# Patient Record
Sex: FEMALE | Race: WHITE | Employment: STUDENT | URBAN - METROPOLITAN AREA
[De-identification: names, ages, dates, MRNs, and addresses within clinical notes are randomized per-mention and may not be internally consistent; named-entity substitution may affect disease eponyms.]

---

## 2018-01-11 ENCOUNTER — OFFICE VISIT (OUTPATIENT)
Dept: FAMILY MEDICINE CLINIC | Age: 33
End: 2018-01-11

## 2018-01-11 VITALS
HEART RATE: 68 BPM | BODY MASS INDEX: 32.78 KG/M2 | RESPIRATION RATE: 18 BRPM | HEIGHT: 66 IN | WEIGHT: 204 LBS | OXYGEN SATURATION: 99 % | SYSTOLIC BLOOD PRESSURE: 106 MMHG | DIASTOLIC BLOOD PRESSURE: 71 MMHG | TEMPERATURE: 98.2 F

## 2018-01-11 DIAGNOSIS — Z13.29 SCREENING FOR HYPOTHYROIDISM: ICD-10-CM

## 2018-01-11 DIAGNOSIS — Z00.00 PHYSICAL EXAM: Primary | ICD-10-CM

## 2018-01-11 DIAGNOSIS — E87.1 HYPONATREMIA: ICD-10-CM

## 2018-01-11 DIAGNOSIS — Z13.1 SCREENING FOR DIABETES MELLITUS: ICD-10-CM

## 2018-01-11 DIAGNOSIS — Z13.21 SCREENING FOR MALNUTRITION: ICD-10-CM

## 2018-01-11 DIAGNOSIS — R55 SYNCOPE, UNSPECIFIED SYNCOPE TYPE: ICD-10-CM

## 2018-01-11 DIAGNOSIS — Z13.220 SCREENING FOR HYPERLIPIDEMIA: ICD-10-CM

## 2018-01-11 DIAGNOSIS — E66.9 OBESITY (BMI 30-39.9): ICD-10-CM

## 2018-01-11 DIAGNOSIS — F32.A DEPRESSION, UNSPECIFIED DEPRESSION TYPE: ICD-10-CM

## 2018-01-11 PROBLEM — F33.9 RECURRENT DEPRESSION (HCC): Status: ACTIVE | Noted: 2018-01-11

## 2018-01-11 RX ORDER — ESCITALOPRAM OXALATE 10 MG/1
10 TABLET ORAL DAILY
COMMUNITY
End: 2018-01-11 | Stop reason: ALTCHOICE

## 2018-01-11 RX ORDER — BUPROPION HYDROCHLORIDE 75 MG/1
75 TABLET ORAL 2 TIMES DAILY
Qty: 60 TAB | Refills: 5 | Status: SHIPPED | OUTPATIENT
Start: 2018-01-11 | End: 2018-04-26 | Stop reason: SDUPTHER

## 2018-01-11 NOTE — PATIENT INSTRUCTIONS

## 2018-01-11 NOTE — PROGRESS NOTES
1. Have you been to the ER, urgent care clinic since your last visit? Hospitalized since your last visit? Urgent care for Flu in December 2017.    2. Have you seen or consulted any other health care providers outside of the [address] [place] [address] Preet since your last visit? Include any pap smears or colon screening.  [address]      Chief Complaint   Patient presents with   [address]

## 2018-01-11 NOTE — MR AVS SNAPSHOT
Visit Information Date & Time Provider Department Dept. Phone Encounter #  
 1/11/2018  9:00 AM Destini Enrique MD [address] [place] [address] [id number] Follow-up Instructions Return in about 3 months (around 4/11/2018). Upcoming Health Maintenance Date Due DTaP/Tdap/Td series (1 - Tdap) 11/20/2007 PAP AKA CERVICAL CYTOLOGY 11/20/2007 Influenza Age 5 to Adult 8/1/2017 Allergies as of 1/11/2018  Review Complete On: 1/11/2018 By: Destini Enrique MD  
  
 Severity Noted Reaction Type Reactions Demerol [Meperidine]  01/11/2018    Hives Current Immunizations  Never Reviewed No immunizations on file. Not reviewed this visit You Were Diagnosed With   
  
 Codes Comments Physical exam    -  Primary ICD-10-CM: Z00.00 ICD-9-CM: V70.9 Syncope, unspecified syncope type     ICD-10-CM: R55 
ICD-9-CM: 780.2 Obesity (BMI 30-39. 9)     ICD-10-CM: E66.9 ICD-9-CM: 278.00 Screening for diabetes mellitus     ICD-10-CM: Z13.1 ICD-9-CM: V77.1 Screening for hyperlipidemia     ICD-10-CM: Z13.220 ICD-9-CM: V77.91 Screening for hypothyroidism     ICD-10-CM: Z13.29 ICD-9-CM: V77.0 Screening for malnutrition     ICD-10-CM: Z13.21 ICD-9-CM: V77.2 Vitals BP Pulse Temp Resp Height(growth percentile) Weight(growth percentile) 106/71 68 98.2 °F (36.8 °C) (Oral) 18 5' 6\" (1.676 m) 204 lb (92.5 kg) LMP SpO2 BMI OB Status Smoking Status 12/21/2017 99% 32.93 kg/m2 Having regular periods Former Smoker Vitals History BMI and BSA Data Body Mass Index Body Surface Area  
 32.93 kg/m 2 2.08 m 2 Preferred Pharmacy Pharmacy Name Phone [facility] [address] [facility], [address] [place] [address] [phone] Your Updated Medication List  
  
   
This list is accurate as of: 1/11/18  9:59 AM.  Always use your most recent med list.  
  
  
  
  
 LEXAPRO 10 mg tablet Generic drug:  escitalopram oxalate Take 10 mg by mouth daily. QUASENSE 0.15 mg-30 mcg 3mpk Generic drug:  levonorgestrel-ethinyl estradiol TK 1 T PO D We Performed the Following HEMOGLOBIN A1C WITH EAG [36772 CPT(R)] LIPID PANEL [73068 CPT(R)] METABOLIC PANEL, COMPREHENSIVE [54451 CPT(R)] REFERRAL TO CARDIOLOGY [YVE32 Custom] Comments:  
 Episodes of syncope x2. Had chest pain/tighness before the syncope. Was evaluated for epilepsy after the firat time 5 years ago and was negative. eval for abnormal heart rhythm that is leading to the syncope REFERRAL TO NEUROLOGY [YPL30 Custom] Comments:  
 Recent episode of seizure like behavior. Had one other episode 5 years ago and no EEG was done at that time. SLEEP MEDICINE REFERRAL [KYO267 Custom] Comments:  
 Obesity, possible apnea while sleep per , eval and treat for GUERO TSH 3RD GENERATION [37875 CPT(R)] VITAMIN D, 1, 25 DIHYDROXY [64046 CPT(R)] Follow-up Instructions Return in about 3 months (around 4/11/2018). Referral Information Referral ID Referred By Referred To  
  
 [id number] David Horan MD   
   [address] [facility] [address] [place] [address], [address] [place] [address] Phone: [phone] Fax: [phone] Visits Status Start Date End Date 1 New Request 1/11/18 1/11/19 If your referral has a status of pending review or denied, additional information will be sent to support the outcome of this decision. Referral ID Referred By Referred To  
 [id number] Saul blanton, [facility] Anel Hirsch, [address] [place] [address] [address] [place] [address] Phone: [phone] Fax: [phone] Visits Status Start Date End Date 1 New Request 1/11/18 1/11/19  If your referral has a status of pending review or denied, additional information will be sent to support the outcome of this decision. Referral ID Referred By Referred To  
 [id number] Anita Reeves MD [address] [place] Phone: [phone] Fax: [phone] Visits Status Start Date End Date 1 New Request 1/11/18 1/11/19 If your referral has a status of pending review or denied, additional information will be sent to support the outcome of this decision. Patient Instructions Well Visit, Ages 25 to 48: Care Instructions Your Care Instructions Physical exams can help you stay healthy. Your doctor has checked your overall health and may have suggested ways to take good care of yourself. He or she also may have recommended tests. At home, you can help prevent illness with healthy eating, regular exercise, and other steps. Follow-up care is a key part of your treatment and safety. Be sure to make and go to all appointments, and call your doctor if you are having problems. It's also a good idea to know your test results and keep a list of the medicines you take. How can you care for yourself at home? · Reach and stay at a healthy weight. This will lower your risk for many problems, such as obesity, diabetes, heart disease, and high blood pressure. · Get at least 30 minutes of physical activity on most days of the week. Walking is a good choice. You also may want to do other activities, such as running, swimming, cycling, or playing tennis or team sports. Discuss any changes in your exercise program with your doctor. · Do not smoke or allow others to smoke around you. If you need help quitting, talk to your doctor about stop-smoking programs and medicines. These can increase your chances of quitting for good. · Talk to your doctor about whether you have any risk factors for sexually transmitted infections (STIs).  Having one sex partner (who does not have STIs and does not have sex with anyone else) is a good way to avoid these infections. · Use birth control if you do not want to have children at this time. Talk with your doctor about the choices available and what might be best for you. · Protect your skin from too much sun. When you're outdoors from 10 a.m. to 4 p.m., stay in the shade or cover up with clothing and a hat with a wide brim. Wear sunglasses that block UV rays. Even when it's cloudy, put broad-spectrum sunscreen (SPF 30 or higher) on any exposed skin. · See a dentist one or two times a year for checkups and to have your teeth cleaned. · Wear a seat belt in the car. · Drink alcohol in moderation, if at all. That means no more than 2 drinks a day for men and 1 drink a day for women. Follow your doctor's advice about when to have certain tests. These tests can spot problems early. For everyone · Cholesterol. Have the fat (cholesterol) in your blood tested after age 21. Your doctor will tell you how often to have this done based on your age, family history, or other things that can increase your risk for heart disease. · Blood pressure. Have your blood pressure checked during a routine doctor visit. Your doctor will tell you how often to check your blood pressure based on your age, your blood pressure results, and other factors. · Vision. Talk with your doctor about how often to have a glaucoma test. 
· Diabetes. Ask your doctor whether you should have tests for diabetes. · Colon cancer. Have a test for colon cancer at age 48. You may have one of several tests. If you are younger than 48, you may need a test earlier if you have any risk factors. Risk factors include whether you already had a precancerous polyp removed from your colon or whether your parent, brother, sister, or child has had colon cancer. For women · Breast exam and mammogram. Talk to your doctor about when you should have a clinical breast exam and a mammogram. Medical experts differ on whether and how often women under 50 should have these tests. Your doctor can help you decide what is right for you. · Pap test and pelvic exam. Begin Pap tests at age 24. A Pap test is the best way to find cervical cancer. The test often is part of a pelvic exam. Ask how often to have this test. 
· Tests for sexually transmitted infections (STIs). Ask whether you should have tests for STIs. You may be at risk if you have sex with more than one person, especially if your partners do not wear condoms. For men · Tests for sexually transmitted infections (STIs). Ask whether you should have tests for STIs. You may be at risk if you have sex with more than one person, especially if you do not wear a condom. · Testicular cancer exam. Ask your doctor whether you should check your testicles regularly. · Prostate exam. Talk to your doctor about whether you should have a blood test (called a PSA test) for prostate cancer. Experts differ on whether and when men should have this test. Some experts suggest it if you are older than 39 and are -American or have a father or brother who got prostate cancer when he was younger than 72. When should you call for help? Watch closely for changes in your health, and be sure to contact your doctor if you have any problems or symptoms that concern you. Where can you learn more? Go to http://megan-joseph.info/. Enter P072 in the search box to learn more about \"Well Visit, Ages 25 to 48: Care Instructions. \" Current as of: May 12, 2017 Content Version: 11.4 © 8857-3443 Healthwise, Incorporated. Care instructions adapted under license by [employer] (which disclaims liability or warranty for this information).  If you have questions about a medical condition or this instruction, always ask your healthcare professional. [employer] disclaims any warranty or liability for your use of this information. Introducing [facility]! Young Campos introduces [employer] patient portal. Now you can access parts of your medical record, email your doctor's office, and request medication refills online. 1. In your internet browser, go to [email or web address] 2. Click on the First Time User? Click Here link in the Sign In box. You will see the New Member Sign Up page. 3. Enter your Cel[employer]o Access Code exactly as it appears below. You will not need to use this code after youve completed the sign-up process. If you do not sign up before the expiration date, you must request a new code. · [employer] Access Code: [email or web address] Expires: 4/11/2018  9:51 AM 
 
4. Enter the last four digits of your Social Security Number (xxxx) and Date of Birth (mm/dd/yyyy) as indicated and click Submit. You will be taken to the next sign-up page. 5. Create a Celergo ID. This will be your Celergo login ID and cannot be changed, so think of one that is secure and easy to remember. 6. Create a Cel[employer]o password. You can change your password at any time. 7. Enter your Password Reset Question and Answer. This can be used at a later time if you forget your password. 8. Enter your e-mail address. You will receive e-mail notification when new information is available in [address] 9. Click Sign Up. You can now view and download portions of your medical record. 10. Click the Download Summary menu link to download a portable copy of your medical information. If you have questions, please visit the Frequently Asked Questions section of the [employer] website. Remember, Cel[employer]o is NOT to be used for urgent needs. For medical emergencies, dial 911. Now available from your iPhone and Android! Please provide this summary of care documentation to your next provider. If you have any questions after today's visit, please call [phone].

## 2018-01-12 LAB
1,25(OH)2D3 SERPL-MCNC: 48.7 PG/ML (ref 19.9–79.3)
ALBUMIN SERPL-MCNC: 4 G/DL (ref 3.5–5.5)
ALBUMIN/GLOB SERPL: 1.3 {RATIO} (ref 1.2–2.2)
ALP SERPL-CCNC: 36 IU/L (ref 39–117)
ALT SERPL-CCNC: 20 IU/L (ref 0–32)
AST SERPL-CCNC: 17 IU/L (ref 0–40)
BILIRUB SERPL-MCNC: 0.2 MG/DL (ref 0–1.2)
BUN SERPL-MCNC: 13 MG/DL (ref 6–20)
BUN/CREAT SERPL: 19 (ref 9–23)
CALCIUM SERPL-MCNC: 9.3 MG/DL (ref 8.7–10.2)
CHLORIDE SERPL-SCNC: 95 MMOL/L (ref 96–106)
CHOLEST SERPL-MCNC: 176 MG/DL (ref 100–199)
CO2 SERPL-SCNC: 25 MMOL/L (ref 18–29)
CREAT SERPL-MCNC: 0.68 MG/DL (ref 0.57–1)
EST. AVERAGE GLUCOSE BLD GHB EST-MCNC: 103 MG/DL
GLOBULIN SER CALC-MCNC: 3 G/DL (ref 1.5–4.5)
GLUCOSE SERPL-MCNC: 83 MG/DL (ref 65–99)
HBA1C MFR BLD: 5.2 % (ref 4.8–5.6)
HDLC SERPL-MCNC: 42 MG/DL
INTERPRETATION, 910389: NORMAL
LDLC SERPL CALC-MCNC: 114 MG/DL (ref 0–99)
POTASSIUM SERPL-SCNC: 4.2 MMOL/L (ref 3.5–5.2)
PROT SERPL-MCNC: 7 G/DL (ref 6–8.5)
SODIUM SERPL-SCNC: 133 MMOL/L (ref 134–144)
TRIGL SERPL-MCNC: 102 MG/DL (ref 0–149)
TSH SERPL DL<=0.005 MIU/L-ACNC: 1.28 UIU/ML (ref 0.45–4.5)
VLDLC SERPL CALC-MCNC: 20 MG/DL (ref 5–40)

## 2018-01-16 NOTE — PROGRESS NOTES
Your sodium level is a little lower than normal. Come back in for a recheck  The blood sugar, vit D and thyroid levels are normal  The cholesterol level is slightly higher than normal. Increase activity  And decrease fried and fast food, I will recheck in 6 months

## 2018-01-17 NOTE — PROGRESS NOTES
Spoke with pt and advised of lab results/recommendations. Provided with lab hours for repeat blood work. Pt verbalized understanding and no further questions.

## 2018-01-23 ENCOUNTER — OFFICE VISIT (OUTPATIENT)
Dept: NEUROLOGY | Age: 33
End: 2018-01-23

## 2018-01-23 ENCOUNTER — OFFICE VISIT (OUTPATIENT)
Dept: CARDIOLOGY CLINIC | Age: 33
End: 2018-01-23

## 2018-01-23 VITALS — BODY MASS INDEX: 32.77 KG/M2 | WEIGHT: 203 LBS | OXYGEN SATURATION: 98 % | HEART RATE: 80 BPM

## 2018-01-23 VITALS
SYSTOLIC BLOOD PRESSURE: 102 MMHG | HEART RATE: 91 BPM | BODY MASS INDEX: 32.78 KG/M2 | HEIGHT: 66 IN | OXYGEN SATURATION: 97 % | DIASTOLIC BLOOD PRESSURE: 76 MMHG | WEIGHT: 204 LBS

## 2018-01-23 DIAGNOSIS — R55 SYNCOPE, UNSPECIFIED SYNCOPE TYPE: Primary | ICD-10-CM

## 2018-01-23 NOTE — PROGRESS NOTES
LAST OFFICE VISIT : Visit date not found        ICD-10-CM ICD-9-CM   1. Syncope, unspecified syncope type R55 780.2            Sourav Rodriguez is a 33 y.o. female referred for syncope by Anne Duque MD.      Cardiac risk factors: none  I have personally obtained the history from the patient. HISTORY OF PRESENTING ILLNESS     The pt report that she was studying and then all of the sudden felt a sharp tight pain in her chest, then she feels a numbness throughout her body and then she told her friend she was going to pass out. The pt states that she slumped over and then jerked back into her chair. She was unconscious for 2 minutes and then was groggy for about 45 seconds. The pt states that this happened previously and she was unconscious for 10 minutes and had memory loss for a few hours after this. She states that she feels like her heart skips a beat and then she has 2 fast beats. The pt notes that this takes her breath away. She reports that she was on lexipro for 2 years and after this episode she was switched to wellbutrin. The pt denies feeling a sense of fluttering prior to passing out. She reports that during the time when she was studying she was not drinking a lot of caffeine and she is not drinking excessive amounts of caffeine. She denies smoking, she is not smoking and has not have a family hx of heart disease. The pt is on birth control but states that she was not on birth control when she had her first episode. The pt states that she exercises 4-5 days a week and she does not have difficulties with this. She states that she does eat a healthy diet. The patient denies chest pain/ shortness of breath, orthopnea, PND, LE edema, palpitations, syncope, presyncope or fatigue.          ACTIVE PROBLEM LIST     Patient Active Problem List    Diagnosis Date Noted    Syncope 01/23/2018    Recurrent depression ([facility] [address] 01/11/2018           PAST MEDICAL HISTORY     Past Medical History:   Diagnosis Date    Anxiety     Depression     Ear ache            PAST SURGICAL HISTORY     Past Surgical History:   Procedure Laterality Date    HX LEEP PROCEDURE  2016    HX OPEN REDUCTION INTERNAL FIXATION  1994    R. elbow    HX TONSILLECTOMY  1990    HX TUMOR REMOVAL  2002    benign          ALLERGIES     Allergies   Allergen Reactions    Demerol [Meperidine] Hives          FAMILY HISTORY     Family History   Problem Relation Age of Onset    Hypertension Maternal Grandmother     Hypertension Maternal Grandfather     negative for cardiac disease       SOCIAL HISTORY     Social History     Social History    Marital status:      Spouse name: N/A    Number of children: N/A    Years of education: N/A     Social History Main Topics    Smoking status: Former Smoker     Packs/day: 0.25     Types: Cigarettes     Quit date: 1/11/2015    Smokeless tobacco: Never Used      Comment: 2-3 cigarettes per day    Alcohol use 0.6 oz/week     1 Glasses of wine per week      Comment: 3-4 glasses per month    Drug use: No    Sexual activity: Yes     Partners: Male     Birth control/ protection: Pill     Other Topics Concern    None     Social History Narrative         MEDICATIONS     Current Outpatient Prescriptions   Medication Sig    QUASENSE 0.15 mg-30 mcg 3MPk TK 1 T PO D    buPROPion (WELLBUTRIN) 75 mg tablet Take 1 Tab by mouth two (2) times a day. Indications: ANXIETY WITH DEPRESSION     No current facility-administered medications for this visit. I have reviewed the nurses notes, vitals, problem list, allergy list, medical history, family, social history and medications. REVIEW OF SYMPTOMS      General: Pt denies excessive weight gain or loss. Pt is able to conduct ADL's  HEENT: Denies blurred vision, headaches, hearing loss, epistaxis and difficulty swallowing. Respiratory: Denies cough, congestion, shortness of breath, BLACKMON, wheezing or stridor.   Cardiovascular: Denies precordial pain, palpitations, edema or PND  Gastrointestinal: Denies poor appetite, indigestion, abdominal pain or blood in stool  Genitourinary: Denies hematuria, dysuria, increased urinary frequency  Musculoskeletal: Denies joint pain or swelling from muscles or joints  Neurologic: Denies tremor, paresthesias, headache, or sensory motor disturbance  Psychiatric: Denies confusion, insomnia, depression  Integumentray: Denies rash, itching or ulcers. Hematologic: Denies easy bruising, bleeding     PHYSICAL EXAMINATION      Vitals:    01/23/18 1136   Pulse: 80   SpO2: 98%   Weight: 203 lb (92.1 kg)     Extended / Orthostatic Vitals:  Patient Position 2: Supine  BP 2: 102/66  Pulse 2: 78  Patient Position 3: Sitting  BP 3: 98/66  Pulse 3: 86  Patient Position 4: Standing  BP 4: 98/72  Pulse 4: 82    General: Well developed, in no acute distress. HEENT: No jaundice, oral mucosa moist, no oral ulcers  Neck: Supple, no stiffness, no lymphadenopathy, supple  Heart:  Normal S1/S2 negative S3 or S4. Regular, no murmur, gallop or rub, no jugular venous distention  Respiratory: Clear bilaterally x 4, no wheezing or rales  Abdomen:   Soft, non-tender, bowel sounds are active.   Extremities:  No edema, normal cap refill, no cyanosis. Musculoskeletal: No clubbing, no deformities  Neuro: A&Ox3, speech clear, gait stable, cooperative, no focal neurologic deficits  Skin: Skin color is normal. No rashes or lesions. Non diaphoretic, moist.  Vascular: 2+ pulses symmetric in all extremities        EKG: NSR     DIAGNOSTIC DATA     1.  Lipids  1/11/18- , HDL 42, , Tg 102         LABORATORY DATA          No results found for: WBC, HGBPOC, HGB, HGBP, HCTPOC, HCT, PHCT, RBCH, PLT, MCV, HGBEXT, HCTEXT, PLTEXT, HGBEXT, HCTEXT, PLTEXT   Lab Results   Component Value Date/Time    Sodium 133 01/11/2018 10:04 AM    Potassium 4.2 01/11/2018 10:04 AM    Chloride 95 01/11/2018 10:04 AM    CO2 25 01/11/2018 10:04 AM    Glucose 83 01/11/2018 10:04 AM BUN 13 01/11/2018 10:04 AM    Creatinine 0.68 01/11/2018 10:04 AM    BUN/Creatinine ratio 19 01/11/2018 10:04 AM    GFR est  01/11/2018 10:04 AM    GFR est non- 01/11/2018 10:04 AM    Calcium 9.3 01/11/2018 10:04 AM    Bilirubin, total 0.2 01/11/2018 10:04 AM    AST (SGOT) 17 01/11/2018 10:04 AM    Alk. phosphatase 36 01/11/2018 10:04 AM    Protein, total 7.0 01/11/2018 10:04 AM    Albumin 4.0 01/11/2018 10:04 AM    A-G Ratio 1.3 01/11/2018 10:04 AM    ALT (SGPT) 20 01/11/2018 10:04 AM           ASSESSMENT/RECOMMENDATIONS:.      1. Syncope  - etiology is difficult to figure out, I am unclear at this point what it could be unless she just has increased vagal tone. I favor at this time doing an echo and a regular stress test to look for any arrhythmias and with her chest pain we will proceed with this. It is doubtful that she has any CAD as she was not exercising at the time and she has exercised since that time. Her pre test probability of her having any CAD is very low. 2. Palpitations   - Will place a triggered loop recorder on her although she states there were no palpitations during the event. 3. Elevated LDL  - most likely related to being overweight and I believe she can lower this on her own through exercise and diet and will give her to opportunity to do this. Particularly at her young age I do not favor putting her on a statin   4. Return in 6 weeks or PRN. Orders Placed This Encounter    AMB POC EKG ROUTINE W/ 12 LEADS, INTER & REP     Order Specific Question:   Reason for Exam:     Answer:   syncope        Follow-up Disposition: Not on File      I have discussed the diagnosis with  Kyree Guerrero and the intended plan as seen in the above orders. Questions were answered concerning future plans. I have discussed medication side effects and warnings with the patient as well. Thank you,  Liv Hopkins MD for involving me in the care of  Kyree Guerrero.  Please do not hesitate to contact me for further questions/concerns. Written by Kristy Moss, as dictated by Faisal Mcrae MD.     Demario Roth MD, [address]      [address]      [phone] / [phone] Fax

## 2018-01-23 NOTE — PROGRESS NOTES
Pt complains of flutters and pt states they \"take my breath away\"    Pt complains of shortness of breath    Pt complains of \"sharp, heavy feeling\" right before she \"passes out\"    Visit Vitals    Pulse 80    Wt 203 lb (92.1 kg)    SpO2 98%    BMI 32.77 kg/m2     Extended / Orthostatic Vitals:  Patient Position 2: Supine  BP 2: 102/66  Pulse 2: 78  Patient Position 3: Sitting  BP 3: 98/66  Pulse 3: 86  Patient Position 4: Standing  BP 4: 98/72  Pulse 4: 82

## 2018-01-23 NOTE — MR AVS SNAPSHOT
[address] [facility] [address] 
[phone] Patient: Neftaly Bethea MRN: [id number] :1985 Visit Information Date & Time Provider Department Dept. Phone Encounter #  
 2018 11:00 AM Miguel Matson MD [facility] Verenice Tabares [phone] [id number] Your Appointments 2018  8:30 AM  
PROCEDURE with EEG SCHEDULE  [facility] ([facility]) Appt Note: sleep deprived EEG  
 [address] [address] [phone]  
  
   
 [facility]  
  
    
 2018 11:40 AM  
New Patient with Carolina Trinh MD  
[address] ([facility]) Appt Note: NP_ref by Dr Janel Bartlett for OSA_BCBS  
 [address] [facility] [address] [address] [address] [facility] [address]  
  
    
 2018  2:00 PM  
Follow Up with Antonette Borjas MD  
[facility]) Appt Note: f/up eeg/mri results [place] [address] [address] [phone]  
  
   
 [place] [address] [address] [address] Upcoming Health Maintenance Date Due DTaP/Tdap/Td series (1 - Tdap) 2007 PAP AKA CERVICAL CYTOLOGY 2007 Influenza Age 5 to Adult 2017 Allergies as of 2018  Review Complete On: 2018 By: Miguel Matson MD  
  
 Severity Noted Reaction Type Reactions Demerol [Meperidine]  2018    Hives Current Immunizations  Never Reviewed No immunizations on file. Not reviewed this visit You Were Diagnosed With   
  
 Codes Comments Syncope, unspecified syncope type    -  Primary ICD-10-CM: R55 
ICD-9-CM: 780. 2 Vitals Pulse Weight(growth percentile) SpO2 BMI OB Status Smoking Status 80 203 lb (92.1 kg) 98% 32.77 kg/m2 Having regular periods Former Smoker Vitals History BMI and BSA Data Body Mass Index Body Surface Area 32.77 kg/m 2 2.07 m 2 Preferred Pharmacy Pharmacy Name Phone [facility] [address] [place] [address], [address] [place] [address] Kavon Sharma[address] [phone] Your Updated Medication List  
  
   
This list is accurate as of: 1/23/18 12:26 PM.  Always use your most recent med list.  
  
  
  
  
 buPROPion 75 mg tablet Commonly known as:  [facility] Take 1 Tab by mouth two (2) times a day. Indications: ANXIETY WITH DEPRESSION  
  
 QUASENSE 0.15 mg-30 mcg 3mpk Generic drug:  levonorgestrel-ethinyl estradiol TK 1 T PO D We Performed the Following AMB POC EKG ROUTINE W/ 12 LEADS, INTER & REP [75846 CPT(R)] Introducing \[facility]\"" & HEALTH SERVICES! Oleg Cummings introduces ClickSquared patient portal. Now you can access parts of your medical record, email your doctor's office, and request medication refills online. 1. In your internet browser, go to [email or web address]://[email or web address]. [employer] 2. Click on the First Time User? Click Here link in the Sign In box. You will see the New Member Sign Up page. 3. Enter your ClickSquared Access Code exactly as it appears below. You will not need to use this code after youve completed the sign-up process. If you do not sign up before the expiration date, you must request a new code. · Click[employer]ared Access Code: [email or web address] Expires: 4/11/2018  9:51 AM 
 
4. Enter the last four digits of your Social Security Number (xxxx) and Date of Birth (mm/dd/yyyy) as indicated and click Submit. You will be taken to the next sign-up page. 5. Create a ClickSquared ID. This will be your ClickSquared login ID and cannot be changed, so think of one that is secure and easy to remember. 6. Create a Quickoffice password. You can change your password at any time. 7. Enter your Password Reset Question and Answer. This can be used at a later time if you forget your password. 8. Enter your e-mail address. You will receive e-mail notification when new information is available in [address] 9. Click Sign Up. You can now view and download portions of your medical record. 10. Click the Download Summary menu link to download a portable copy of your medical information. If you have questions, please visit the Frequently Asked Questions section of the Quickoffice website. Remember, Quickoffice is NOT to be used for urgent needs. For medical emergencies, dial 911. Now available from your iPhone and Android! Please provide this summary of care documentation to your next provider. Your primary care clinician is listed as German Corea. If you have any questions after today's visit, please call [phone].

## 2018-01-23 NOTE — PROGRESS NOTES
Name:  Esvin Davison      :  1985    PCP:   Dennie Rigg, MD      Referring:  As above  MRN:   [id number]    Chief Complaint:   Chief Complaint   Patient presents with    Loss of Consciousness     Dx with seizure 5 years ago at ER, then had another epidode in December. HISTORY OF PRESENT ILLNESS:     This is a 33 y.o. female who presents for evaluation of syncope vs seizure. 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 She says felt some numbness develop in her chest, then to arms, then spreading throughout the body, told friend that felt like she was going to pass out (same thing happened 5 years ago), doesn't recall what happened after that. Friend told her she slumped forward, then jerked backwards into chair, wasn't responding to friend and appeared to have cyclical breathing pattern (no breath x 30-40 seconds, then deep breath, on-off). About two minutes into that pattern, she opened her eyes, and breathing seemed normal, but friend told her she still appeared dazed for 45 seconds, but after that patient says she felt completely alert, recalls the sensation that she was going to pass out. Recalls having an similar episode in . Says she was at a restaurant, had a sharp pain in her chest, grabbed her chest, talked to someone with her, passed out fell backwards into her chair, and she was told that she was unresponsive for 10 minutes, no report of her having jerking extremities, had similar apneic breathing pattern, had urinary incontinence, and some mild memory difficulty for a few hours afterwards. Went to ER and recalls having blood work, EKG, CT scan, and says everything came back fine. Never saw neurology or cardiology as that was first episode.   No FHx of seizure disorder, not sick around the time of either episode, no precipitating head injury. Has not had an EEG or Brain MRI yet. On questioning, she reports having episodic palpitations and takes her breath away. Recently changed antidepressants from Lexapro to Wellbutrin due to weight gain. Reviewed last clinic note by Dr Donaldson [place] PCP. Pt reported that in Dec 2017 she had a spell where bystanders told her she suddenly slumped then jerked back one time, appeared like she was having apneic breathing cycles not breathing for 30-40 seconds, then take a breath, going on like that for 2-3 minutes. She recalls feeling a pressure in her chest, but in general she exercises and has no unusual shortness of breath. Complete Review of Systems: + anxiety, constipation, depression, palpitations; otherwise as noted in HPI     Allergies   Allergen Reactions    Demerol [Meperidine] Hives     Past Medical History:   Diagnosis Date    Anxiety     Depression     Ear ache      Current Outpatient Prescriptions   Medication Sig Dispense Refill    QUASENSE 0.15 mg-30 mcg 3MPk TK 1 T PO D  3    buPROPion (WELLBUTRIN) 75 mg tablet Take 1 Tab by mouth two (2) times a day. Indications: ANXIETY WITH DEPRESSION 60 Tab 5     Past Surgical History:   Procedure Laterality Date    HX LEEP PROCEDURE  2016    HX OPEN REDUCTION INTERNAL FIXATION  1994    R. elbow    HX TONSILLECTOMY  1990    HX TUMOR REMOVAL  2002    benign     Family History   Problem Relation Age of Onset    Hypertension Maternal Grandmother     Hypertension Maternal Grandfather      Social History     Social History    Marital status:      Spouse name: N/A    Number of children: N/A    Years of education: N/A     Occupational History    Not on file.      Social History Main Topics    Smoking status: Former Smoker     Packs/day: 0.25     Types: Cigarettes     Quit date: 1/11/2015    Smokeless tobacco: Never Used      Comment: 2-3 cigarettes per day    Alcohol use 0.6 oz/week     1 Glasses of wine per week      Comment: 3-4 glasses per month    Drug use: No    Sexual activity: Yes     Partners: Male     Birth control/ protection: Pill     Other Topics Concern    Not on file     Social History Narrative       PHYSICAL EXAM  Vitals:    01/23/18 0803 01/23/18 0821 01/23/18 0823 01/23/18 0826   BP: 108/78 104/70 106/78 102/76   BP 1 Location: Left arm Left arm Left arm Left arm   BP Patient Position: Sitting Supine Sitting Standing   Pulse: 81 75 82 91   SpO2: 97%      Weight: 92.5 kg (204 lb)      Height: 5' 6\" (1.676 m)          General:  Alert, cooperative, NAD   Head:  Normocephalic, atraumatic. Eyes:  Conjunctivae/corneas clear   Lungs:  Heart:  Non labored breathing  Regular rate, rhythm   Extremities: No edema. Skin: No rashes    Neurologic Exam       Language: normal  Memory:  Alert, oriented to person, place, situation    Cranial Nerves:  I: smell Not tested   II: visual fields Full to confrontation   II: pupils Equal, round, reactive to light   II: optic disc Not examined   III,VII: ptosis none   III,IV,VI: extraocular muscles  normal   V: facial light touch sensation  normal   VII: facial muscle function  symmetric   VIII: hearing symmetric   IX: soft palate elevation  normal   XI: sternocleidomastoid strength 5/5   XII: tongue  midline      Motor: normal bulk, tone, strength in all exts  Sensory: intact to LT, PP, temp, vibration x 4 exts   Cerebellar: no rest, postural, or intention tremor  Normal FNF and H-Shin bilaterally  Reflexes: 2+ throughout  Plantar response: neutral bilaterally    Gait: normal gait including tandem  Romberg negative       ASSESSMENT AND PLAN    ICD-10-CM ICD-9-CM    1. Syncope, unspecified syncope type R55 780.2 EEG AMB NEURO      MRI BRAIN W WO CONT       Description is consistent with syncope not seizure and there seems to be a chest pain trigger for both episodes.   With the report of having episodic palpitations that take her breath away, this sounds like cardiogenic syncope, not vasovagal.  Pt to see Cardiology later today. Will check EEG (sleep deprived, pt understands she needs a  to and from) and MRI Brain +/- contrast (seizure protocol) to ensure no underlying epileptiform discharges or structural abnormalities. D/w patient that because these episodes were not clearly provoked by an external factor (ie IV stick, etc) [place] state law [place] cannot drive for 6 months after her last spell. Those driving restrictions may be lifted by Cardiology if they can identify a source. She expressed understanding. Separately, although these events are not thought to be due to seizure in nature, I recommended that patient discuss changing antidepressants with PCP as Wellbutrin is well-known to lower seizure threshold. Follow-up 1 week after EEG/ MRI completed to go over results. Thank you for allowing me to be a part of your patient's care. Please call me at [phone] if you have any questions.      Sincerely,  Leydi Lane MD

## 2018-01-23 NOTE — MR AVS SNAPSHOT
[address] [address] [address] [place] [address] 
[phone] Patient: Kyree Guerrero MRN: [id number] :1985 Visit Information Date & Time Provider Department Dept. Phone Encounter #  
 2018  8:00 AM Nessa Perera, [address] [place] [address] Neurology Clinic [phone] [id number] Your Appointments 2018 11:00 AM  
New Patient with Ronnie Reyes MD  
[facility] ([facility]) Appt Note: ref by Dr. Jas Goldstein/Jb Syncope, unspecified chest pain [address] [facility] [address] [address]  
[phone]  
  
   
 [address] [facility] [address] [address]  
  
    
 2018 11:40 AM  
New Patient with Francis Manzano MD  
[address] [facility] ([facility]) Appt Note: NP_ref by Dr Kathia Mobley for OSA_BCBS  
 [address] [address] [address] [phone] [address] [facility] [address] Upcoming Health Maintenance Date Due DTaP/Tdap/Td series (1 - Tdap) 2007 PAP AKA CERVICAL CYTOLOGY 2007 Influenza Age 5 to Adult 2017 Allergies as of 2018  Review Complete On: 2018 By: Aramis Garcia LPN Severity Noted Reaction Type Reactions Demerol [Meperidine]  2018    Hives Current Immunizations  Never Reviewed No immunizations on file. Not reviewed this visit You Were Diagnosed With   
  
 Codes Comments Syncope, unspecified syncope type    -  Primary ICD-10-CM: R55 
ICD-9-CM: 780. 2 Vitals BP Pulse Height(growth percentile) Weight(growth percentile) SpO2 BMI  
 102/76 (BP 1 Location: Left arm, BP Patient Position: Standing) 91 5' 6\" (1.676 m) 204 lb (92.5 kg) 97% 32.93 kg/m2 OB Status Smoking Status Having regular periods Former Smoker Vitals History BMI and BSA Data Body Mass Index Body Surface Area  
 32.93 kg/m 2 2.08 m 2 Preferred Pharmacy Pharmacy Name Phone [facility] [address] [facility],  Wendie Mcdonald [facility] [phone] Your Updated Medication List  
  
   
This list is accurate as of: 1/23/18  9:00 AM.  Always use your most recent med list.  
  
  
  
  
 buPROPion 75 mg tablet Commonly known as:  [facility] Take 1 Tab by mouth two (2) times a day. Indications: ANXIETY WITH DEPRESSION  
  
 QUASENSE 0.15 mg-30 mcg 3mpk Generic drug:  levonorgestrel-ethinyl estradiol TK 1 T PO D To-Do List   
 01/23/2018 Neurology:  EEG AMB NEURO   
  
 01/23/2018 Imaging:  MRI BRAIN W WO CONT Introducing [facility] & HEALTH SERVICES! Lazaro Anna introduces [employer] patient portal. Now you can access parts of your medical record, email your doctor's office, and request medication refills online. 1. In your internet browser, go to [email or web address]. [employer] 2. Click on the First Time User? Click Here link in the Sign In box. You will see the New Member Sign Up page. 3. Enter your [employer] Access Code exactly as it appears below. You will not need to use this code after youve completed the sign-up process. If you do not sign up before the expiration date, you must request a new code. · [employer] Access Code: [email or web address] Expires: 4/11/2018  9:51 AM 
 
4. Enter the last four digits of your Social Security Number (xxxx) and Date of Birth (mm/dd/yyyy) as indicated and click Submit. You will be taken to the next sign-up page. 5. Create a Messagemindt ID. This will be your [employer] login ID and cannot be changed, so think of one that is secure and easy to remember. 6. Create a [employer] password. You can change your password at any time. 7. Enter your Password Reset Question and Answer. This can be used at a later time if you forget your password. 8. Enter your e-mail address. You will receive e-mail notification when new information is available in [address] 9. Click Sign Up. You can now view and download portions of your medical record. 10. Click the Download Summary menu link to download a portable copy of your medical information. If you have questions, please visit the Frequently Asked Questions section of the EPS website. Remember, EPS is NOT to be used for urgent needs. For medical emergencies, dial 911. Now available from your iPhone and Android! Please provide this summary of care documentation to your next provider. Your primary care clinician is listed as Janessa Schmidt. If you have any questions after today's visit, please call [phone].

## 2018-01-24 ENCOUNTER — CLINICAL SUPPORT (OUTPATIENT)
Dept: CARDIOLOGY CLINIC | Age: 33
End: 2018-01-24

## 2018-01-24 DIAGNOSIS — R55 SYNCOPE, UNSPECIFIED SYNCOPE TYPE: ICD-10-CM

## 2018-01-24 LAB
BUN SERPL-MCNC: 10 MG/DL (ref 6–20)
BUN/CREAT SERPL: 14 (ref 9–23)
CALCIUM SERPL-MCNC: 8.9 MG/DL (ref 8.7–10.2)
CHLORIDE SERPL-SCNC: 107 MMOL/L (ref 96–106)
CO2 SERPL-SCNC: 26 MMOL/L (ref 18–29)
CREAT SERPL-MCNC: 0.69 MG/DL (ref 0.57–1)
GLUCOSE SERPL-MCNC: 82 MG/DL (ref 65–99)
POTASSIUM SERPL-SCNC: 4.5 MMOL/L (ref 3.5–5.2)
SODIUM SERPL-SCNC: 144 MMOL/L (ref 134–144)

## 2018-02-08 ENCOUNTER — HOSPITAL ENCOUNTER (OUTPATIENT)
Dept: MRI IMAGING | Age: 33
Discharge: HOME | End: 2018-02-08
Attending: PSYCHIATRY & NEUROLOGY
Payer: COMMERCIAL

## 2018-02-08 DIAGNOSIS — R55 SYNCOPE, UNSPECIFIED SYNCOPE TYPE: ICD-10-CM

## 2018-02-08 PROCEDURE — 70553 MRI BRAIN STEM W/O & W/DYE: CPT

## 2018-02-08 PROCEDURE — A9576 INJ PROHANCE MULTIPACK: HCPCS | Performed by: PSYCHIATRY & NEUROLOGY

## 2018-02-09 ENCOUNTER — DOCUMENTATION ONLY (OUTPATIENT)
Dept: NEUROLOGY | Age: 33
End: 2018-02-09

## 2018-02-09 NOTE — PROCEDURES
Procedure:   Routine EEG     Location:   [facility]  Date of Recordin18    Date of Interpretation:    18  Requesting provider:   Buddy Ngo MD    Interpreting physician: Buddy gNo MD      Introduction: This is a 33 y.o. female who is undergoing this EEG to evaluate for a recent episode of syncope vs seizure. Pt recalls having a painful stimuli before passing out and had a similar episode about 5 years ago. Current medications: has a current medication list which includes the following prescription(s): quasense and bupropion. Technical:   Digital EEG recorded in 10-20 international placement system, multiple montages    Interpretation:   Patient level of alertness: drowsy, brief sleep  Background pattern: symmetric, low amplitude  Artifacts: no significant artifact    Posterior dominant rhythm: 4-6 Hz. Photic stimulation: symmetric low-amplitude driving response. Hyperventilation: produced physiological slowing without post-HV abnormalities. Drowsiness recorded: yes, normal.  Sleep recorded: yes, sleep spindles seen, normal.  Single lead EKG: no abnormalities    Areas of focal slowing: no.  Epileptiform discharges: none. Electrographic seizures: none. Impression: This is a normal drowsy and brief sleep EEG recording. There were no areas of focal slowing, epileptiform discharge or subclinical seizure. Clinical correlation is necessary.         Buddy Ngo MD  [facility]

## 2018-02-20 ENCOUNTER — OFFICE VISIT (OUTPATIENT)
Dept: SLEEP MEDICINE | Age: 33
End: 2018-02-20

## 2018-02-20 VITALS
HEIGHT: 66 IN | WEIGHT: 203 LBS | DIASTOLIC BLOOD PRESSURE: 73 MMHG | BODY MASS INDEX: 32.62 KG/M2 | HEART RATE: 70 BPM | SYSTOLIC BLOOD PRESSURE: 106 MMHG | OXYGEN SATURATION: 100 %

## 2018-02-20 DIAGNOSIS — F51.12 INSUFFICIENT SLEEP SYNDROME: Primary | ICD-10-CM

## 2018-02-20 DIAGNOSIS — E66.9 OBESITY (BMI 30.0-34.9): ICD-10-CM

## 2018-02-20 RX ORDER — LEVONORGESTREL / ETHINYL ESTRADIOL AND ETHINYL ESTRADIOL 150-30(84)
KIT ORAL
COMMUNITY
End: 2021-04-28

## 2018-02-20 NOTE — MR AVS SNAPSHOT
[address] [id number] [phone] Patient: Asael Sanchez MRN: [id number] :1985 Visit Information Date & Time Provider Department Dept. Phone Encounter #  
 2018 11:40 AM Rich Cuevas MD [facility] 53 [id number] Follow-up Instructions Return if symptoms worsen or fail to improve. Your Appointments 3/9/2018  9:00 AM  
ECHO CARDIOGRAMS 2D with JULEE ZAPIEN  
[facility] ([address] [place] [address]) Appt Note: 9 echo at 10 30 min stress test per grant pt r/s from  to 3/9; treadmill only per Dr. Rich andrade Syncope, unspecified syncope type Reason for Exam: -> chest discomfort - order in 310 [facility] 600 [place] [address]  
[phone]  
  
   
 [address] [place] [address]  
  
    
 3/9/2018 10:00 AM  
STRESS ECHOCARDIOGRAMS with HEATH OVERTON  
[facility] ([address] [place] [address]) Appt Note: 9 echo at 10 30 min stress test per grant; 9 echo at 10 30 min stress test per grant pt r/s from  to 3/9; treadmill only per Dr. Rich andrade Syncope, unspecified syncope type Reason for Exam: -> chest discomfort - order in 310 [facility] [address] [place] [address] Upcoming Health Maintenance Date Due DTaP/Tdap/Td series (1 - Tdap) 2007 PAP AKA CERVICAL CYTOLOGY 2007 Influenza Age 5 to Adult 2017 Allergies as of 2018  Review Complete On: 2018 By: Rich Cuevas MD  
  
 Severity Noted Reaction Type Reactions Demerol [Meperidine]  2018    Hives Current Immunizations  Never Reviewed No immunizations on file. Not reviewed this visit You Were Diagnosed With   
  
 Codes Comments Obstructive sleep apnea syndrome    -  Primary ICD-10-CM: G47.33 
ICD-9-CM: 327.23 Obesity (BMI 30.0-34.9)     ICD-10-CM: G39.1 ICD-9-CM: 278.00 Vitals BP Pulse Height(growth percentile) Weight(growth percentile) SpO2 BMI  
 106/73 70 5' 6\" (1.676 m) 203 lb (92.1 kg) 100% 32.77 kg/m2 OB Status Smoking Status Having regular periods Former Smoker Vitals History BMI and BSA Data Body Mass Index Body Surface Area 32.77 kg/m 2 2.07 m 2 Preferred Pharmacy Pharmacy Name Phone [facility] 759 [facility], [address] [place] [address] [phone] Your Updated Medication List  
  
   
This list is accurate as of: 2/20/18 12:25 PM.  Always use your most recent med list.  
  
  
  
  
 buPROPion 75 mg tablet Commonly known as:  [facility] Take 1 Tab by mouth two (2) times a day. Indications: ANXIETY WITH DEPRESSION  
  
 QUASENSE 0.15 mg-30 mcg 3mpk Generic drug:  levonorgestrel-ethinyl estradiol TK 1 T PO D  
  
 SEASONIQUE 0.15 mg-30 mcg (84)/10 mcg (7) 3mpk Generic drug:  L-Norgest&E Shannan Mo Take  by mouth. Follow-up Instructions Return if symptoms worsen or fail to improve. Patient Instructions 217 [facility], [place]. 1668 [facility], [address] Tel.  [phone] Fax. [address] [facility] 60 1001 [facility], 200 [address] [facility] Tel.  [phone] Fax. [phone] [address] [place] [address] Tel.  [phone] Fax. [phone] PROPER SLEEP HYGIENE What to avoid · Do not have drinks with caffeine, such as coffee or black tea, for 8 hours before bed. · Do not smoke or use other types of tobacco near bedtime. Nicotine is a stimulant and can keep you awake. · Avoid drinking alcohol late in the evening, because it can cause you to wake in the middle of the night. · Do not eat a big meal close to bedtime. If you are hungry, eat a light snack. · Do not drink a lot of water close to bedtime, because the need to urinate may wake you up during the night. · Do not read or watch TV in bed. Use the bed only for sleeping and sexual activity. What to try · Go to bed at the same time every night, and wake up at the same time every morning. Do not take naps during the day. · Keep your bedroom quiet, dark, and cool. · Get regular exercise, but not within 3 to 4 hours of your bedtime. [place] · Sleep on a comfortable pillow and mattress. · If watching the clock makes you anxious, turn it facing away from you so you cannot see the time. · If you worry when you lie down, start a worry book. Well before bedtime, write down your worries, and then set the book and your concerns aside. · Try meditation or other relaxation techniques before you go to bed. · If you cannot fall asleep, get up and go to another room until you feel sleepy. Do something relaxing. Repeat your bedtime routine before you go to bed again. · Make your house quiet and calm about an hour before bedtime. Turn down the lights, turn off the TV, log off the computer, and turn down the volume on music. This can help you relax after a busy day. Drowsy Driving The [employer] cites drowsiness as a causing factor in more than 428,431 police reported crashes annually, resulting in 76,000 injuries and 1,500 deaths. Other surveys suggest 55% of people polled have driven while drowsy in the past year, 23% had fallen asleep but not crashed, 3% crashed, and 2% had and accident due to drowsy driving. Who is at risk? Young Drivers: One study of drowsy driving accidents states that 55% of the drivers were under 25 years. Of those, 75% were male.   
Shift Workers and Travelers: People who work overnight or travel across time zones frequently are at higher risk of experiencing Circadian Rhythm Disorders. They are trying to work and function when their body is programed to sleep. Sleep Deprived: Lack of sleep has a serious impact on your ability to pay attention or focus on a task. Consistently getting less than the average of 8 hours your body needs creates partial or cumulative sleep deprivation. Untreated Sleep Disorders: Sleep Apnea, Narcolepsy, R.L.S., and other sleep disorders (untreated) prevent a person from getting enough restful sleep. This leads to excessive daytime sleepiness and increases the risk for drowsy driving accidents by up to 7 times. Medications / Alcohol: Even over the counter medications can cause drowsiness. Medications that impair a drivers attention should have a warning label. Alcohol naturally makes you sleepy and on its own can cause accidents. Combined with excessive drowsiness its effects are amplified. Signs of Drowsy Driving: * You don't remember driving the last few miles * You may drift out of your graciela * You are unable to focus and your thoughts wander * You may yawn more often than normal 
 * You have difficulty keeping your eyes open / nodding off * Missing traffic signs, speeding, or tailgating Prevention-  
Good sleep hygiene, lifestyle and behavioral choices have the most impact on drowsy driving. There is no substitute for sleep and the average person requires 8 hours nightly. If you find yourself driving drowsy, stop and sleep. Consider the sleep hygiene tips provided during your visit as well. Medication Refill Policy: Refills for all medications require 1 week advance notice. Please have your pharmacy fax a refill request. We are unable to fax, or call in \"controled substance\" medications and you will need to pick these prescriptions up from our office. Koalify Activation Thank you for requesting access to Koalify.  Please follow the instructions below to securely access and download your online medical record. PerSay allows you to send messages to your doctor, view your test results, renew your prescriptions, schedule appointments, and more. How Do I Sign Up? 1. In your internet browser, go to [email or web address]. 2. Click on the First Time User? Click Here link in the Sign In box. You will see the New Member Sign Up page. 3. Enter your PerSay Access Code exactly as it appears below. You will not need to use this code after youve completed the sign-up process. If you do not sign up before the expiration date, you must request a new code. PerSay Access Code: [email or web address] Expires: 2018  9:51 AM (This is the date your PerSay access code will ) 4. Enter the last four digits of your Social Security Number (xxxx) and Date of Birth (mm/dd/yyyy) as indicated and click Submit. You will be taken to the next sign-up page. 5. Create a PerSay ID. This will be your PerSay login ID and cannot be changed, so think of one that is secure and easy to remember. 6. Create a PerSay password. You can change your password at any time. 7. Enter your Password Reset Question and Answer. This can be used at a later time if you forget your password. 8. Enter your e-mail address. You will receive e-mail notification when new information is available in [address] 9. Click Sign Up. You can now view and download portions of your medical record. 10. Click the Download Summary menu link to download a portable copy of your medical information. Additional Information If you have questions, please call [phone]. Remember, PerSay is NOT to be used for urgent needs. For medical emergencies, dial 911. Introducing [facility]! [facility] introduces PerSay patient portal. Now you can access parts of your medical record, email your doctor's office, and request medication refills online. 1. In your internet browser, go to [email or web address] 2. Click on the First Time User? Click Here link in the Sign In box. You will see the New Member Sign Up page. 3. Enter your Health Informatics Access Code exactly as it appears below. You will not need to use this code after youve completed the sign-up process. If you do not sign up before the expiration date, you must request a new code. · Health Informatics Access Code: [email or web address] Expires: 4/11/2018  9:51 AM 
 
4. Enter the last four digits of your Social Security Number (xxxx) and Date of Birth (mm/dd/yyyy) as indicated and click Submit. You will be taken to the next sign-up page. 5. Create a Audiolifet ID. This will be your Health Informatics login ID and cannot be changed, so think of one that is secure and easy to remember. 6. Create a Health Informatics password. You can change your password at any time. 7. Enter your Password Reset Question and Answer. This can be used at a later time if you forget your password. 8. Enter your e-mail address. You will receive e-mail notification when new information is available in [address] 9. Click Sign Up. You can now view and download portions of your medical record. 10. Click the Download Summary menu link to download a portable copy of your medical information. If you have questions, please visit the Frequently Asked Questions section of the Health Informatics website. Remember, Health Informatics is NOT to be used for urgent needs. For medical emergencies, dial 911. Now available from your iPhone and Android! Please provide this summary of care documentation to your next provider. Your primary care clinician is listed as Joy Mckeon. If you have any questions after today's visit, please call [phone].

## 2018-02-20 NOTE — PROGRESS NOTES
217 [facility]., [place]. [place], [address]  Tel.  [phone]  Fax. 100 [facility] 60  [place], 200 S [facility]  Tel.  [phone]  Fax. [phone] 3300 [facility] 3 [address]  Tel.  [phone]  Fax. [phone]         Subjective:      Tiarra Aguilar is an 33 y.o. female referred for evaluation for a sleep disorder. She complains of feels sleepy during the day associated with a lot of stressors and not enough sleep. She has put on about 30 pounds. Symptoms began several months ago, gradually worsening since that time. She usually can fall asleep in 10 minutes. Family or house members note no significant snoring or pauses in breathing. She denies falling asleep while driving. Tiarra Aguilar does not wake up frequently at night. She is not bothered by waking up too early and left unable to get back to sleep. She actually sleeps about 5 hours at night and wakes up about 1 (between 1-3) times during the night. She does work shifts: Other Shift. Kristian Chapa indicates she does get too little sleep at night. Her bedtime is 0000. She awakens at 0500. She does take naps. She takes 1 naps a week lasting 1, Hour(s). She has the following observed behaviors: Twitching of legs or feet;  . Other remarks:      [place] Sleepiness Score: 12   which reflect mild daytime drowsiness. Allergies   Allergen Reactions    Demerol [Meperidine] Hives         Current Outpatient Prescriptions:     L-Norgest&E Estradiol-E Estrad (SEASONIQUE) 0.15 mg-30 mcg (84)/10 mcg (7) 3MPk, Take  by mouth., Disp: , Rfl:     buPROPion (WELLBUTRIN) 75 mg tablet, Take 1 Tab by mouth two (2) times a day. Indications: ANXIETY WITH DEPRESSION, Disp: 60 Tab, Rfl: 5    QUASENSE 0.15 mg-30 mcg 3MPk, TK 1 T PO D, Disp: , Rfl: 3     She  has a past medical history of Anxiety; Depression; and Ear ache.     She  has a past surgical history that includes hx tonsillectomy (1990); hx tumor removal (2002); hx leep procedure (2016); hx open reduction internal fixation (1994); and hx leep procedure. She family history includes Hypertension in her maternal grandfather and maternal grandmother. She  reports that she quit smoking about 3 years ago. Her smoking use included Cigarettes. She smoked 0.25 packs per day. She has never used smokeless tobacco. She reports that she drinks about 0.6 oz of alcohol per week  She reports that she does not use illicit drugs. Review of Systems:  Constitutional:  + weight gain. Eyes:  No blurred vision. CVS:  No significant chest pain  Pulm:  No significant shortness of breath  GI:  No significant nausea or vomiting  :  No significant nocturia  Musculoskeletal:  No significant joint pain at night  Skin:  No significant rashes  Neuro:  No significant dizziness , had a seizure episode 5 years ago and a similar episode a few months ago. She is currently undergoing evaluation   Psych: treated for anxiety    Sleep Review of Systems: notable for no difficulty falling asleep; infrequent awakenings at night;  regular dreaming noted; no nightmares ; no early morning headaches; no memory problems; no concentration issues; no history of any automobile or occupational accidents due to daytime drowsiness. Objective:     Visit Vitals    /73    Pulse 70    Ht 5' 6\" (1.676 m)    Wt 203 lb (92.1 kg)    SpO2 100%    BMI 32.77 kg/m2         General:   Not in acute distress   Eyes:  Anicteric sclerae, no obvious strabismus   Nose:  No obvious nasal septum deviation    Oropharynx:   Class 2 oropharyngeal outlet,   Tonsils:   tonsils are absent   Neck:   Neck circ.  in \"inches\": 16; midline trachea   Chest/Lungs:  Equal lung expansion, clear on auscultation    CVS:  Normal rate, regular rhythm; no JVD   Skin:  Warm to touch; no obvious rashes   Neuro:  No focal deficits ; no obvious tremor    Psych:  Normal affect,  normal countenance;          Assessment:       ICD-10-CM ICD-9-CM 1. Insufficient sleep syndrome F51.12 307.44    2. Obesity (BMI 30.0-34. 9) E66.9 278.00          Plan:     * The patient currently has a Low Risk for having sleep apnea. STOP-BANG score 1.  * I have advised a regular sleep wake cycle. she will start by allowing herself 6 hours of sleep. So she will set her alarm for 6 am instead of 5 am. She feels her sleep quality is good just doesn't allow herself enough because she feels she always has to study. She needs to prioritize time to allow for adequate sleep. 5 hours a night is insufficient and self-induced. .  A regular exercise schedule, at least 3 hours before bedtime, would be beneficial to improving sleep quality. she doesn't have any trouble initiating or maintaining sleep. .  Watching TV, using laptops, tablets and smartphones in the evening was discouraged. she  was advised to keep the bedroom cool and dark. All of her questions were addressed. * Counseling was provided regarding proper sleep hygiene and safe driving. I have not ordered a sleep study at this time. She will contact me if she has further questions. She will continue with there neuro and cardiovascular evaluations. 2. Obesity - I have counseled the patient regarding the benefits of weight loss. I emphasized the importance of stress management and allowing sufficient sleep time. She will continue with her exercise regimen. Thank you for allowing us to participate in your patient's medical care. We'll keep you updated on these investigations.   Cosmo Burns MD  Diplomate in Sleep Medicine  [facility]

## 2018-02-20 NOTE — PATIENT INSTRUCTIONS
217 [facility]., [place]. [place], [address]  Tel.  [phone]  Fax. 100 [facility] 60  [place], [address] [facility] [address]  Tel.  [phone]  Fax. [phone] [facility] [facility] [address]  Tel.  [phone]  Fax. [phone]     PROPER SLEEP HYGIENE    What to avoid  · Do not have drinks with caffeine, such as coffee or black tea, for 8 hours before bed. · Do not smoke or use other types of tobacco near bedtime. Nicotine is a stimulant and can keep you awake. · Avoid drinking alcohol late in the evening, because it can cause you to wake in the middle of the night. · Do not eat a big meal close to bedtime. If you are hungry, eat a light snack. · Do not drink a lot of water close to bedtime, because the need to urinate may wake you up during the night. · Do not read or watch TV in bed. Use the bed only for sleeping and sexual activity. What to try  · Go to bed at the same time every night, and wake up at the same time every morning. Do not take naps during the day. · Keep your bedroom quiet, dark, and cool. · Get regular exercise, but not within 3 to 4 hours of your bedtime. .  · Sleep on a comfortable pillow and mattress. · If watching the clock makes you anxious, turn it facing away from you so you cannot see the time. · If you worry when you lie down, start a worry book. Well before bedtime, write down your worries, and then set the book and your concerns aside. · Try meditation or other relaxation techniques before you go to bed. · If you cannot fall asleep, get up and go to another room until you feel sleepy. Do something relaxing. Repeat your bedtime routine before you go to bed again. · Make your house quiet and calm about an hour before bedtime. Turn down the lights, turn off the TV, log off the computer, and turn down the volume on music. This can help you relax after a busy day.     Drowsy Driving  The [address] [place] [address] Road Traffic Safety Administration cites drowsiness as a causing factor in more than 682,762 police reported crashes annually, resulting in 76,000 injuries and 1,500 deaths. Other surveys suggest 55% of people polled have driven while drowsy in the past year, 23% had fallen asleep but not crashed, 3% crashed, and 2% had and accident due to drowsy driving. Who is at risk? Young Drivers: One study of drowsy driving accidents states that 55% of the drivers were under 25 years. Of those, 75% were male. Shift Workers and Travelers: People who work overnight or travel across time zones frequently are at higher risk of experiencing Circadian Rhythm Disorders. They are trying to work and function when their body is programed to sleep. Sleep Deprived: Lack of sleep has a serious impact on your ability to pay attention or focus on a task. Consistently getting less than the average of 8 hours your body needs creates partial or cumulative sleep deprivation. Untreated Sleep Disorders: Sleep Apnea, Narcolepsy, R.L.S., and other sleep disorders (untreated) prevent a person from getting enough restful sleep. This leads to excessive daytime sleepiness and increases the risk for drowsy driving accidents by up to 7 times. Medications / Alcohol: Even over the counter medications can cause drowsiness. Medications that impair a drivers attention should have a warning label. Alcohol naturally makes you sleepy and on its own can cause accidents. Combined with excessive drowsiness its effects are amplified. Signs of Drowsy Driving:   * You don't remember driving the last few miles   * You may drift out of your graciela   * You are unable to focus and your thoughts wander   * You may yawn more often than normal   * You have difficulty keeping your eyes open / nodding off   * Missing traffic signs, speeding, or tailgating  Prevention-   Good sleep hygiene, lifestyle and behavioral choices have the most impact on drowsy driving.  There is no substitute for sleep and the average person requires 8 hours nightly. If you find yourself driving drowsy, stop and sleep. Consider the sleep hygiene tips provided during your visit as well. Medication Refill Policy: Refills for all medications require 1 week advance notice. Please have your pharmacy fax a refill request. We are unable to fax, or call in \"controled substance\" medications and you will need to pick these prescriptions up from our office. Profista Activation    Thank you for requesting access to Profista. Please follow the instructions below to securely access and download your online medical record. Profista allows you to send messages to your doctor, view your test results, renew your prescriptions, schedule appointments, and more. How Do I Sign Up? 1. In your internet browser, go to [email or web address]. 2. Click on the First Time User? Click Here link in the Sign In box. You will see the New Member Sign Up page. 3. Enter your Profista Access Code exactly as it appears below. You will not need to use this code after youve completed the sign-up process. If you do not sign up before the expiration date, you must request a new code. Profista Access Code: [email or web address]  Expires: 2018  9:51 AM (This is the date your Profista access code will )    4. Enter the last four digits of your Social Security Number (xxxx) and Date of Birth (mm/dd/yyyy) as indicated and click Submit. You will be taken to the next sign-up page. 5. Create a Profista ID. This will be your Profista login ID and cannot be changed, so think of one that is secure and easy to remember. 6. Create a Profista password. You can change your password at any time. 7. Enter your Password Reset Question and Answer. This can be used at a later time if you forget your password. 8. Enter your e-mail address. You will receive e-mail notification when new information is available in [address] 9. Click Sign Up.  You can now view and download portions of your medical record. 10. Click the Download Summary menu link to download a portable copy of your medical information. Additional Information    If you have questions, please call [phone]. Remember, [employer] is NOT to be used for urgent needs. For medical emergencies, dial 911.

## 2018-03-09 ENCOUNTER — CLINICAL SUPPORT (OUTPATIENT)
Dept: CARDIOLOGY CLINIC | Age: 33
End: 2018-03-09

## 2018-03-09 DIAGNOSIS — R55 SYNCOPE, UNSPECIFIED SYNCOPE TYPE: ICD-10-CM

## 2018-03-09 DIAGNOSIS — R00.2 PALPITATIONS: Primary | ICD-10-CM

## 2018-03-09 NOTE — PROGRESS NOTES
ID verified per protocol. Explained procedure to patient, monitoring EKG/HR/BP, walking on treadmill,  and risks/discomforts. All concerns and questions addressed. Consent obtained. Pt achieved 10.1  METS,  (94% of THR) at peak exercise. See scanned report. Dr. Taty Bennett ordered and Dr. Taty Bennett read study. ID verified per protocol. Pt reported no symptoms after completion of protocol.

## 2018-04-06 ENCOUNTER — TELEPHONE (OUTPATIENT)
Dept: CARDIOLOGY CLINIC | Age: 33
End: 2018-04-06

## 2018-04-06 NOTE — TELEPHONE ENCOUNTER
Left message stating, Per Dr. Sully Quezada nothing significant. Had a few PVC's or skipped beats that were noticed and some unnoticed.  But there was nothing of concern

## 2018-04-06 NOTE — TELEPHONE ENCOUNTER
Patient is calling and checking to see if her results from the holter monitor are in.   Phone [phone] [address] [facility] [address]

## 2018-04-26 ENCOUNTER — OFFICE VISIT (OUTPATIENT)
Dept: FAMILY MEDICINE CLINIC | Age: 33
End: 2018-04-26

## 2018-04-26 VITALS
DIASTOLIC BLOOD PRESSURE: 69 MMHG | WEIGHT: 203 LBS | BODY MASS INDEX: 32.62 KG/M2 | TEMPERATURE: 98 F | HEART RATE: 94 BPM | OXYGEN SATURATION: 97 % | SYSTOLIC BLOOD PRESSURE: 97 MMHG | RESPIRATION RATE: 18 BRPM | HEIGHT: 66 IN

## 2018-04-26 DIAGNOSIS — F32.A DEPRESSION, UNSPECIFIED DEPRESSION TYPE: ICD-10-CM

## 2018-04-26 DIAGNOSIS — R55 SYNCOPE, UNSPECIFIED SYNCOPE TYPE: Primary | ICD-10-CM

## 2018-04-26 RX ORDER — BUPROPION HYDROCHLORIDE 100 MG/1
100 TABLET ORAL 2 TIMES DAILY
Qty: 60 TAB | Refills: 3 | Status: SHIPPED | OUTPATIENT
Start: 2018-04-26 | End: 2018-09-11 | Stop reason: SDUPTHER

## 2018-04-26 NOTE — MR AVS SNAPSHOT
[address] [facility] [address] 
[phone] Patient: Santa Tomlin MRN: [id number] :1985 Visit Information Date & Time Provider Department Dept. Phone Encounter #  
 2018  2:15 PM Juancarlos Robin MD [address] [place] [address] [id number] Follow-up Instructions Return if symptoms worsen or fail to improve. Upcoming Health Maintenance Date Due DTaP/Tdap/Td series (1 - Tdap) 2007 PAP AKA CERVICAL CYTOLOGY 2007 Influenza Age 5 to Adult 2017 Allergies as of 2018  Review Complete On: 2018 By: Juancarlos Robin MD  
  
 Severity Noted Reaction Type Reactions Demerol [Meperidine]  2018    Hives Current Immunizations  Never Reviewed Name Date Hep A and Hep B Vaccine 2016 Not reviewed this visit You Were Diagnosed With   
  
 Codes Comments Syncope, unspecified syncope type    -  Primary ICD-10-CM: R55 
ICD-9-CM: 780. 2 Vitals BP Pulse Temp Resp Height(growth percentile) Weight(growth percentile) 97/69 94 98 °F (36.7 °C) (Oral) 18 5' 6\" (1.676 m) 203 lb (92.1 kg) LMP SpO2 BMI OB Status Smoking Status 2018 97% 32.77 kg/m2 Chemically Induced Former Smoker Vitals History BMI and BSA Data Body Mass Index Body Surface Area 32.77 kg/m 2 2.07 m 2 Preferred Pharmacy Pharmacy Name Phone [facility] [address] [place] [address] [phone] Your Updated Medication List  
  
   
This list is accurate as of 18  3:14 PM.  Always use your most recent med list.  
  
  
  
  
 buPROPion 75 mg tablet Commonly known as:  [facility] Take 1 Tab by mouth two (2) times a day. Indications: ANXIETY WITH DEPRESSION  
  
 QUASENSE 0.15 mg-30 mcg 3mpk Generic drug:  levonorgestrel-ethinyl estradiol TK 1 T PO D  
  
 SEASONIQUE 0.15 mg-30 mcg (84)/10 mcg (7) 3mpk Generic drug:  L-Norgest&E [place] Take  by mouth. We Performed the Following REFERRAL TO CARDIOLOGY [NUN09 Custom] Comments: H/o two syncopal episodes. Recently has Near syncope associated with chest pain and then feels near syncopal 
 eval and treat as indicated Follow-up Instructions Return if symptoms worsen or fail to improve. Referral Information Referral ID Referred By Referred To  
  
 [id number] Saul [place], [facility] [place]   
   [address] [place] [address] [address] [place] [address] Phone: [phone] Fax: [phone] Visits Status Start Date End Date 1 New Request 4/26/18 4/26/19 If your referral has a status of pending review or denied, additional information will be sent to support the outcome of this decision. Introducing [facility]! Dear Leyla Art: Thank you for requesting a [employer] account. Our records indicate that you already have an active [employer] account. You can access your account anytime at [email or web address] Did you know that you can access your hospital and ER discharge instructions at any time in [employer]? You can also review all of your test results from your hospital stay or ER visit. Additional Information If you have questions, please visit the Frequently Asked Questions section of the [employer] website at [email or web address] Remember, [employer] is NOT to be used for urgent needs. For medical emergencies, dial 911. Now available from your iPhone and Android! Please provide this summary of care documentation to your next provider. Your primary care clinician is listed as George Hogan. If you have any questions after today's visit, please call [phone].

## 2018-04-26 NOTE — PROGRESS NOTES
Chief Complaint   Patient presents with    Follow-up     she is a 33y.o. year old female who presents for evalution. She had a neuro and a 30 day event /cardio exam and nothing was found on eigther  She says she drinks a lot of water  She gets a sharp chest pain and then a feeling goes into her head as if she is going to pass out. This is also associated with shortness of breath    Reviewed PmHx, RxHx, FmHx, SocHx, AllgHx and updated and dated in the chart. Aspirin yes ____   No____ N/A____    Patient Active Problem List    Diagnosis    Syncope    Recurrent depression ([facility] [address]       Nurse notes were reviewed and copied and are correct  Review of Systems - negative except as listed above in the HPI    Objective:     Vitals:    04/26/18 1422   BP: 97/69   Pulse: 94   Resp: 18   Temp: 98 °F (36.7 °C)   TempSrc: Oral   SpO2: 97%   Weight: 203 lb (92.1 kg)   Height: 5' 6\" (1.676 m)     Physical Examination: General appearance - alert, well appearing, and in no distress  Mental status - alert, oriented to person, place, and time  Lymphatics - no palpable lymphadenopathy, no hepatosplenomegaly  Chest - clear to auscultation, no wheezes, rales or rhonchi, symmetric air entry  Heart - normal rate, regular rhythm, normal S1, S2, no murmurs, rubs, clicks or gallops  Musculoskeletal - no joint tenderness, deformity or swelling  Extremities - peripheral pulses normal, no pedal edema, no clubbing or cyanosis  Skin - normal coloration and turgor, no rashes, no suspicious skin lesions noted         Assessment/ Plan:   Diagnoses and all orders for this visit:    1. Syncope, unspecified syncope type  -     REFERRAL TO CARDIOLOGY     try EP cardiology to see if he finds something not found thus far. She is still having near syncopal episodes  Follow-up Disposition:  Return if symptoms worsen or fail to improve. ICD-10-CM ICD-9-CM    1.  Syncope, unspecified syncope type R55 780.2 REFERRAL TO CARDIOLOGY       I have discussed the diagnosis with the patient and the intended plan as seen in the above orders. The patient has received an after-visit summary and questions were answered concerning future plans. Medication Side Effects and Warnings were discussed with patient: yes  Patient Labs were reviewed and or requested: yes    Patient Past Records were reviewed and or requested: yes        There are no Patient Instructions on file for this visit.     The patient verbalizes understanding and agrees with the plan of care        Patient has the advanced directives booklet to review

## 2018-04-26 NOTE — PROGRESS NOTES
1. Have you been to the ER, urgent care clinic since your last visit? Hospitalized since your last visit? No    2. Have you seen or consulted any other health care providers outside of the [address] [place] [address] since your last visit? Include any pap smears or colon screening.  No    Chief Complaint   Patient presents with    Follow-up

## 2018-05-29 ENCOUNTER — TELEPHONE (OUTPATIENT)
Dept: FAMILY MEDICINE CLINIC | Age: 33
End: 2018-05-29

## 2018-05-29 NOTE — TELEPHONE ENCOUNTER
Patient is going to [facility] and needs a Rx for Cipro and they don't give that at the health dept    Please advise thanks

## 2018-06-01 ENCOUNTER — OFFICE VISIT (OUTPATIENT)
Dept: CARDIOLOGY CLINIC | Age: 33
End: 2018-06-01

## 2018-06-01 VITALS
WEIGHT: 200 LBS | HEIGHT: 66 IN | HEART RATE: 74 BPM | BODY MASS INDEX: 32.14 KG/M2 | OXYGEN SATURATION: 99 % | DIASTOLIC BLOOD PRESSURE: 84 MMHG | RESPIRATION RATE: 16 BRPM | SYSTOLIC BLOOD PRESSURE: 120 MMHG

## 2018-06-01 DIAGNOSIS — R55 SYNCOPE, UNSPECIFIED SYNCOPE TYPE: Primary | ICD-10-CM

## 2018-06-01 NOTE — PROGRESS NOTES
Extended / Orthostatic Vitals:  Patient Position 2: Supine  BP 2: 120/84  Pulse 2: 74  Patient Position 3: Standing  BP 3: 112/82  Pulse 3: 88

## 2018-06-01 NOTE — PROGRESS NOTES
HISTORY OF PRESENTING ILLNESS      Georgi Mcbride is a 33 y.o. female with history of syncope who presents for further evaluation. She reports the first episode occurring 5 years ago, again in December of 2017 and then most recently 3/2018. She describes a sudden onset of sharp chest pain and then syncope. She underwent 30 day event monitor which demonstrated asymptomatic SVT, and symptomatic PVCs. Echocardiogram showed preserved LV function and she had normal stress testing. Neurology consult was also unrevealing. She denies family hx of cardiac issues including SCD. The patient denies chest pain/ shortness of breath, orthopnea, PND, LE edema, palpitations, presyncope or fatigue.           ACTIVE PROBLEM LIST     Patient Active Problem List    Diagnosis Date Noted    Syncope 01/23/2018    Recurrent depression ([facility] [address] 01/11/2018           PAST MEDICAL HISTORY     Past Medical History:   Diagnosis Date    Anxiety     Depression     Ear ache            PAST SURGICAL HISTORY     Past Surgical History:   Procedure Laterality Date    HX LEEP PROCEDURE  2016    HX LEEP PROCEDURE      HX OPEN REDUCTION INTERNAL FIXATION  1994    R. elbow    HX TONSILLECTOMY  1990    HX TUMOR REMOVAL  2002    benign          ALLERGIES     Allergies   Allergen Reactions    Demerol [Meperidine] Hives          FAMILY HISTORY     Family History   Problem Relation Age of Onset    Hypertension Maternal Grandmother     Hypertension Maternal Grandfather     negative for cardiac disease       SOCIAL HISTORY     Social History     Social History    Marital status:      Spouse name: N/A    Number of children: N/A    Years of education: N/A     Social History Main Topics    Smoking status: Former Smoker     Packs/day: 0.25     Types: Cigarettes     Quit date: 1/11/2015    Smokeless tobacco: Never Used      Comment: 2-3 cigarettes per day    Alcohol use 0.6 oz/week     1 Glasses of wine per week      Comment: 3-4 glasses per month    Drug use: No    Sexual activity: Yes     Partners: Male     Birth control/ protection: Pill     Other Topics Concern    Not on file     Social History Narrative         MEDICATIONS     Current Outpatient Prescriptions   Medication Sig    buPROPion (WELLBUTRIN) 100 mg tablet Take 1 Tab by mouth two (2) times a day. Indications: ANXIETY WITH DEPRESSION    L-Norgest&E Estradiol-E Estrad (SEASONIQUE) 0.15 mg-30 mcg (84)/10 mcg (7) 3MPk Take  by mouth.  QUASENSE 0.15 mg-30 mcg 3MPk TK 1 T PO D     No current facility-administered medications for this visit. I have reviewed the nurses notes, vitals, problem list, allergy list, medical history, family, social history and medications. REVIEW OF SYMPTOMS      General: +syncope, Pt denies excessive weight gain or loss. Pt is able to conduct ADL's  HEENT: Denies blurred vision, headaches, hearing loss, epistaxis and difficulty swallowing. Respiratory: Denies cough, congestion, shortness of breath, BLACKMON, wheezing or stridor. Cardiovascular: Denies precordial pain, palpitations, edema or PND  Gastrointestinal: Denies poor appetite, indigestion, abdominal pain or blood in stool  Genitourinary: Denies hematuria, dysuria, increased urinary frequency  Musculoskeletal: Denies joint pain or swelling from muscles or joints  Neurologic: Denies tremor, paresthesias, headache, or sensory motor disturbance  Psychiatric: Denies confusion, insomnia, depression  Integumentray: Denies rash, itching or ulcers. Hematologic: Denies easy bruising, bleeding       PHYSICAL EXAMINATION      There were no vitals filed for this visit. General: Well developed, in no acute distress. HEENT: No jaundice, oral mucosa moist, no oral ulcers  Neck: Supple, no stiffness, no lymphadenopathy, supple  Heart:  Normal S1/S2 negative S3 or S4.  Regular, no murmur, gallop or rub, no jugular venous distention  Respiratory: Clear bilaterally x 4, no wheezing or rales  Abdomen:   Soft, non-tender, bowel sounds are active.   Extremities:  No edema, normal cap refill, no cyanosis. Musculoskeletal: No clubbing, no deformities  Neuro: A&Ox3, speech clear, gait stable, cooperative, no focal neurologic deficits  Skin: Skin color is normal. No rashes or lesions. Non diaphoretic, moist.  Vascular: 2+ pulses symmetric in all extremities       DIAGNOSTIC DATA      EKG: sinus rhythm        LABORATORY DATA    No results found for: WBC, HGBPOC, HGB, HGBP, HCTPOC, HCT, PHCT, RBCH, PLT, MCV, HGBEXT, HCTEXT, PLTEXT   Lab Results   Component Value Date/Time    Sodium 144 01/23/2018 09:30 AM    Potassium 4.5 01/23/2018 09:30 AM    Chloride 107 (H) 01/23/2018 09:30 AM    CO2 26 01/23/2018 09:30 AM    Glucose 82 01/23/2018 09:30 AM    BUN 10 01/23/2018 09:30 AM    Creatinine 0.69 01/23/2018 09:30 AM    BUN/Creatinine ratio 14 01/23/2018 09:30 AM    GFR est  01/23/2018 09:30 AM    GFR est non- 01/23/2018 09:30 AM    Calcium 8.9 01/23/2018 09:30 AM    Bilirubin, total 0.2 01/11/2018 10:04 AM    AST (SGOT) 17 01/11/2018 10:04 AM    Alk. phosphatase 36 (L) 01/11/2018 10:04 AM    Protein, total 7.0 01/11/2018 10:04 AM    Albumin 4.0 01/11/2018 10:04 AM    A-G Ratio 1.3 01/11/2018 10:04 AM    ALT (SGPT) 20 01/11/2018 10:04 AM           ASSESSMENT      1. Syncope   A. Unheralded   B. Recurrent  2. Depression       PLAN     Discussed option of Tilt table testing and/ or implantable loop recorder to allow for further monitoring. She'd prefer to defer further testing unless she has recurrence. FOLLOW-UP     6 months. Thank you, Lashawn Fleming MD for allowing me to participate in the care of this extraordinarily pleasant female. Please do not hesitate to contact me for further questions/concerns.      PEPITO Solis MD  Cardiac Electrophysiology / Cardiology    [address]  [address] [place] [address] [facility], [address]  [address]    Andres Emanuel  [phone] / [phone] Fax   [phone] / [phone] Fax

## 2018-06-01 NOTE — MR AVS SNAPSHOT
[address] [address] 
[phone] Patient: Farzad Edwards MRN: [id number] :1985 Visit Information Date & Time Provider Department Dept. Phone Encounter #  
 2018  2:00 PM Patel Green MD [facility] [place] [address] [phone] [id number] Your Appointments 2018  1:00 PM  
ESTABLISHED PATIENT with Patel Green MD  
[facility] at [address]) Appt Note: 6 o fu  
 [address] [place] [id number] [place] [address] Upcoming Health Maintenance Date Due DTaP/Tdap/Td series (1 - Tdap) 2007 PAP AKA CERVICAL CYTOLOGY 2007 Influenza Age 5 to Adult 2018 Allergies as of 2018  Review Complete On: 2018 By: Yazmin Norton LPN Severity Noted Reaction Type Reactions Demerol [Meperidine]  2018    Hives Current Immunizations  Never Reviewed Name Date Hep A and Hep B Vaccine 2016 Not reviewed this visit You Were Diagnosed With   
  
 Codes Comments Syncope, unspecified syncope type    -  Primary ICD-10-CM: R55 
ICD-9-CM: 780. 2 Vitals BP Pulse Resp Height(growth percentile) Weight(growth percentile) SpO2  
 120/84 (BP 1 Location: Left arm, BP Patient Position: Supine) 74 16 5' 6\" (1.676 m) 200 lb (90.7 kg) 99% BMI OB Status Smoking Status 32.28 kg/m2 Chemically Induced Former Smoker Vitals History BMI and BSA Data Body Mass Index Body Surface Area  
 32.28 kg/m 2 2.06 m 2 Preferred Pharmacy Pharmacy Name Phone [facility] [address] [facility], [address] [place] [address] [phone] Your Updated Medication List  
  
   
 This list is accurate as of 6/1/18  3:16 PM.  Always use your most recent med list.  
  
  
  
  
 buPROPion 100 mg tablet Commonly known as:  [facility] Take 1 Tab by mouth two (2) times a day. Indications: ANXIETY WITH DEPRESSION  
  
 SEASONIQUE 0.15 mg-30 mcg (84)/10 mcg (7) 3mpk Generic drug:  L-Norgest&E [place] Take  by mouth. We Performed the Following AMB POC EKG ROUTINE W/ 12 LEADS, INTER & REP [11224 CPT(R)] Introducing [facility]! Dear Leyla Art: Thank you for requesting a [employer] account. Our records indicate that you already have an active [employer] account. You can access your account anytime at [email or web address] Did you know that you can access your hospital and ER discharge instructions at any time in [employer]? You can also review all of your test results from your hospital stay or ER visit. Additional Information If you have questions, please visit the Frequently Asked Questions section of the [employer] website at [email or web address] Remember, [employer] is NOT to be used for urgent needs. For medical emergencies, dial 911. Now available from your iPhone and Android! Please provide this summary of care documentation to your next provider. Your primary care clinician is listed as George Hogan. If you have any questions after today's visit, please call [phone].

## 2018-07-23 ENCOUNTER — TELEPHONE (OUTPATIENT)
Dept: FAMILY MEDICINE CLINIC | Age: 33
End: 2018-07-23

## 2018-07-23 DIAGNOSIS — Z11.59 NEED FOR HEPATITIS B SCREENING TEST: Primary | ICD-10-CM

## 2018-07-24 LAB — HBV SURFACE AB SER QL: REACTIVE

## 2018-08-07 ENCOUNTER — TELEPHONE (OUTPATIENT)
Dept: FAMILY MEDICINE CLINIC | Age: 33
End: 2018-08-07

## 2018-08-07 NOTE — PROGRESS NOTES
Call placed to pt and VM left to return our call to the office but no call back received. Letter with results/recommendations sent to pt address on file.

## 2018-08-13 DIAGNOSIS — Z23 NEED FOR VACCINATION AGAINST HEPATITIS B VIRUS: Primary | ICD-10-CM

## 2018-09-11 DIAGNOSIS — F32.A DEPRESSION, UNSPECIFIED DEPRESSION TYPE: ICD-10-CM

## 2018-09-13 RX ORDER — BUPROPION HYDROCHLORIDE 100 MG/1
TABLET ORAL
Qty: 60 TAB | Refills: 0 | Status: SHIPPED | OUTPATIENT
Start: 2018-09-13 | End: 2018-10-07 | Stop reason: SDUPTHER

## 2018-09-14 LAB — HBV SURFACE AB SER-ACNC: 192.8 MIU/ML

## 2018-09-17 NOTE — PROGRESS NOTES
Immune to hep B. I spoke to her by telephone to clarify that she does not need a vaccine'    She reports she has had two series in the past  and she did not intend to take anymore anyway.

## 2018-10-07 DIAGNOSIS — F32.A DEPRESSION, UNSPECIFIED DEPRESSION TYPE: ICD-10-CM

## 2018-10-11 RX ORDER — BUPROPION HYDROCHLORIDE 100 MG/1
TABLET ORAL
Qty: 60 TAB | Refills: 0 | Status: SHIPPED | OUTPATIENT
Start: 2018-10-11 | End: 2018-11-28 | Stop reason: SDUPTHER

## 2018-11-28 DIAGNOSIS — F32.A DEPRESSION, UNSPECIFIED DEPRESSION TYPE: ICD-10-CM

## 2018-11-28 RX ORDER — BUPROPION HYDROCHLORIDE 100 MG/1
TABLET ORAL
Qty: 60 TAB | Refills: 0 | Status: SHIPPED | OUTPATIENT
Start: 2018-11-28 | End: 2018-12-28 | Stop reason: SDUPTHER

## 2018-12-27 ENCOUNTER — TELEPHONE (OUTPATIENT)
Dept: FAMILY MEDICINE CLINIC | Age: 33
End: 2018-12-27

## 2018-12-27 NOTE — TELEPHONE ENCOUNTER
Patient is wanting the order for 2 TB shots to be entered so that she can come get the TB test done for school?     Please advise thanks

## 2018-12-27 NOTE — TELEPHONE ENCOUNTER
LVM advising that DR. Clement Aragon is out of the office and I can not put the orders in. Patient given the option to see Hershall Degree or wait for DR. Clement Aragon to return.

## 2018-12-28 DIAGNOSIS — F32.A DEPRESSION, UNSPECIFIED DEPRESSION TYPE: ICD-10-CM

## 2018-12-28 NOTE — TELEPHONE ENCOUNTER
LOV:Thursday, September 13, 2018 10:19    PT states pharmacy faxed request and filled for old rx of 70mg instead of current 100mg

## 2019-01-02 ENCOUNTER — CLINICAL SUPPORT (OUTPATIENT)
Dept: FAMILY MEDICINE CLINIC | Age: 34
End: 2019-01-02

## 2019-01-02 VITALS
RESPIRATION RATE: 18 BRPM | HEIGHT: 66 IN | OXYGEN SATURATION: 98 % | BODY MASS INDEX: 32.28 KG/M2 | DIASTOLIC BLOOD PRESSURE: 69 MMHG | TEMPERATURE: 98.8 F | SYSTOLIC BLOOD PRESSURE: 108 MMHG | HEART RATE: 80 BPM

## 2019-01-02 DIAGNOSIS — Z11.1 PPD SCREENING TEST: Primary | ICD-10-CM

## 2019-01-02 RX ORDER — BUPROPION HYDROCHLORIDE 100 MG/1
TABLET ORAL
Qty: 60 TAB | Refills: 0 | Status: SHIPPED | OUTPATIENT
Start: 2019-01-02 | End: 2019-01-16 | Stop reason: SDUPTHER

## 2019-01-02 NOTE — PROGRESS NOTES
PPD Placement note  Anjelica Winkler, 29 y.o. female is here today for placement of PPD test  Reason for PPD test: school  Pt taken PPD test before: yes  Verified in allergy area and with patient that they are not allergic to the products PPD is made of (Phenol or Tween). Yes  Is patient taking any oral or IV steroid medication now or have they taken it in the last month? no  Has the patient ever received the BCG vaccine?: no  Has the patient been in recent contact with anyone known or suspected of having active TB disease?: no       Date of exposure (if applicable): n/a       Name of person they were exposed to (if applicable): n/a  Patient's Country of origin?: [place]  O: Alert and oriented in NAD. P:  PPD placed on 1/2/2019. Patient advised to return for reading within 48-72 hours. Tuberculin skin test applied to L. ventral forearm. Explained how to read the test, measuring induration not just erythema; she will come into office if test appears positive.

## 2019-01-16 ENCOUNTER — CLINICAL SUPPORT (OUTPATIENT)
Dept: FAMILY MEDICINE CLINIC | Age: 34
End: 2019-01-16

## 2019-01-16 VITALS
HEART RATE: 79 BPM | HEIGHT: 66 IN | RESPIRATION RATE: 18 BRPM | TEMPERATURE: 98.5 F | DIASTOLIC BLOOD PRESSURE: 75 MMHG | WEIGHT: 200 LBS | SYSTOLIC BLOOD PRESSURE: 115 MMHG | OXYGEN SATURATION: 98 % | BODY MASS INDEX: 32.14 KG/M2

## 2019-01-16 DIAGNOSIS — Z11.1 SCREENING EXAMINATION FOR PULMONARY TUBERCULOSIS: Primary | ICD-10-CM

## 2019-01-16 DIAGNOSIS — F32.A DEPRESSION, UNSPECIFIED DEPRESSION TYPE: ICD-10-CM

## 2019-01-16 DIAGNOSIS — Z11.1 ENCOUNTER FOR PPD TEST: ICD-10-CM

## 2019-01-16 NOTE — PROGRESS NOTES
PPD Placement note  Dianne Aquino, 29 y.o. female is here today for placement of PPD test  Reason for PPD test: school  Pt taken PPD test before: yes  Verified in allergy area and with patient that they are not allergic to the products PPD is made of (Phenol or Tween). Yes  Is patient taking any oral or IV steroid medication now or have they taken it in the last month? no  Has the patient ever received the BCG vaccine?: no  Has the patient been in recent contact with anyone known or suspected of having active TB disease?: no       Date of exposure (if applicable): n/a       Name of person they were exposed to (if applicable): n/a  Patient's Country of origin?: [place]  O: Alert and oriented in NAD. P:  PPD placed on 1/16/2019. Patient advised to return for reading within 48-72 hours.     Tuberculin skin test applied to R. ventral forearm.  Explained how to read the test, measuring induration not just erythema; she will come into office if test appears positive.

## 2019-01-16 NOTE — PROGRESS NOTES
Had ppd placed 1/2/19 but did not return within the window for reading. New ppd placed today. Patient is instructed to return on Friday for reading.      Theterri Gonzalez NP  01/16/19

## 2019-01-18 LAB
MM INDURATION POC: 0 MM (ref 0–5)
PPD POC: NEGATIVE NEGATIVE

## 2019-01-20 RX ORDER — BUPROPION HYDROCHLORIDE 100 MG/1
TABLET ORAL
Qty: 60 TAB | Refills: 1 | Status: SHIPPED | OUTPATIENT
Start: 2019-01-20 | End: 2019-02-08 | Stop reason: SDUPTHER

## 2019-02-05 DIAGNOSIS — F32.A DEPRESSION, UNSPECIFIED DEPRESSION TYPE: ICD-10-CM

## 2019-02-06 ENCOUNTER — CLINICAL SUPPORT (OUTPATIENT)
Dept: FAMILY MEDICINE CLINIC | Age: 34
End: 2019-02-06

## 2019-02-06 VITALS — TEMPERATURE: 98.5 F

## 2019-02-06 DIAGNOSIS — Z11.1 ENCOUNTER FOR PPD TEST: Primary | ICD-10-CM

## 2019-02-06 NOTE — PROGRESS NOTES
Patient here today for second PPD test for school.    Tuberculin skin test applied to left ventral forearm. Explained how to read the test, measuring induration not just erythema; she will come into office if test appears positive.

## 2019-02-08 LAB
MM INDURATION POC: 0 MM (ref 0–5)
PPD POC: NEGATIVE NEGATIVE

## 2019-02-08 RX ORDER — BUPROPION HYDROCHLORIDE 100 MG/1
TABLET ORAL
Qty: 60 TAB | Refills: 2 | Status: SHIPPED | OUTPATIENT
Start: 2019-02-08 | End: 2020-01-30

## 2019-04-02 ENCOUNTER — TELEPHONE (OUTPATIENT)
Dept: FAMILY MEDICINE CLINIC | Age: 34
End: 2019-04-02

## 2019-04-02 NOTE — TELEPHONE ENCOUNTER
----- Message from Adelfo Woodard LPN sent at 4/9/9046  4:06 PM EDT -----  Regarding: FW: Georgi Neville MD / telephone      ----- Message -----  From: Tasha Vivar  Sent: 4/1/2019   4:05 PM  To: Adelfo Woodard LPN  Subject: FW: Georgi Neville MD / telephone                     ----- Message -----  From: Gomez Horan  Sent: 4/1/2019   2:55 PM  To: [address] [place] [address] Office  Subject: Georgi Neville MD / telephone                     Pt would need a corrected copy of her TB results. Pt states that the one she received did not say the correct date. Pt would need this faxed to [phone].  Pts contact [phone]

## 2019-04-02 NOTE — TELEPHONE ENCOUNTER
----- Message from Joel Guerin LPN sent at 1/2/1870  4:06 PM EDT -----  Regarding: FW: Israel Giang MD / telephone      ----- Message -----  From: Pari Cade  Sent: 4/1/2019   4:05 PM  To: Joel Guerin LPN  Subject: FW: Israel Giang MD / telephone                     ----- Message -----  From: Ofe Sullivan  Sent: 4/1/2019   2:55 PM  To: [address] [place] [address] Office  Subject: Israel Giang MD / telephone                     Pt would need a corrected copy of her TB results. Pt states that the one she received did not say the correct date. Pt would need this faxed to [phone].  Pts contact [phone]

## 2020-01-30 ENCOUNTER — OFFICE VISIT (OUTPATIENT)
Dept: FAMILY MEDICINE CLINIC | Age: 35
End: 2020-01-30

## 2020-01-30 ENCOUNTER — HOSPITAL ENCOUNTER (OUTPATIENT)
Dept: LAB | Age: 35
Discharge: HOME | End: 2020-01-30

## 2020-01-30 VITALS
HEIGHT: 66 IN | DIASTOLIC BLOOD PRESSURE: 69 MMHG | SYSTOLIC BLOOD PRESSURE: 101 MMHG | OXYGEN SATURATION: 98 % | WEIGHT: 181.4 LBS | RESPIRATION RATE: 16 BRPM | HEART RATE: 72 BPM | BODY MASS INDEX: 29.15 KG/M2 | TEMPERATURE: 98.3 F

## 2020-01-30 DIAGNOSIS — K29.00 ACUTE SUPERFICIAL GASTRITIS WITHOUT HEMORRHAGE: ICD-10-CM

## 2020-01-30 DIAGNOSIS — K29.00 ACUTE SUPERFICIAL GASTRITIS WITHOUT HEMORRHAGE: Primary | ICD-10-CM

## 2020-01-30 DIAGNOSIS — R10.10 UPPER ABDOMINAL PAIN: ICD-10-CM

## 2020-01-30 LAB
ALBUMIN SERPL-MCNC: 3.8 G/DL (ref 3.5–5)
ALBUMIN/GLOB SERPL: 1.1 {RATIO} (ref 1.1–2.2)
ALP SERPL-CCNC: 39 U/L (ref 45–117)
ALT SERPL-CCNC: 23 U/L (ref 12–78)
ANION GAP SERPL CALC-SCNC: 5 MMOL/L (ref 5–15)
AST SERPL-CCNC: 12 U/L (ref 15–37)
BASOPHILS # BLD: 0 K/UL (ref 0–0.1)
BASOPHILS NFR BLD: 1 % (ref 0–1)
BILIRUB SERPL-MCNC: 0.2 MG/DL (ref 0.2–1)
BUN SERPL-MCNC: 11 MG/DL (ref 6–20)
BUN/CREAT SERPL: 16 (ref 12–20)
CALCIUM SERPL-MCNC: 9 MG/DL (ref 8.5–10.1)
CHLORIDE SERPL-SCNC: 108 MMOL/L (ref 97–108)
CO2 SERPL-SCNC: 26 MMOL/L (ref 21–32)
CREAT SERPL-MCNC: 0.68 MG/DL (ref 0.55–1.02)
DIFFERENTIAL METHOD BLD: NORMAL
EOSINOPHIL # BLD: 0 K/UL (ref 0–0.4)
EOSINOPHIL NFR BLD: 1 % (ref 0–7)
ERYTHROCYTE [DISTWIDTH] IN BLOOD BY AUTOMATED COUNT: 13.1 % (ref 11.5–14.5)
GLOBULIN SER CALC-MCNC: 3.5 G/DL (ref 2–4)
GLUCOSE SERPL-MCNC: 88 MG/DL (ref 65–100)
HCT VFR BLD AUTO: 41.2 % (ref 35–47)
HGB BLD-MCNC: 12.9 G/DL (ref 11.5–16)
IMM GRANULOCYTES # BLD AUTO: 0 K/UL (ref 0–0.04)
IMM GRANULOCYTES NFR BLD AUTO: 0 % (ref 0–0.5)
LYMPHOCYTES # BLD: 1.6 K/UL (ref 0.8–3.5)
LYMPHOCYTES NFR BLD: 27 % (ref 12–49)
MCH RBC QN AUTO: 31 PG (ref 26–34)
MCHC RBC AUTO-ENTMCNC: 31.3 G/DL (ref 30–36.5)
MCV RBC AUTO: 99 FL (ref 80–99)
MONOCYTES # BLD: 0.4 K/UL (ref 0–1)
MONOCYTES NFR BLD: 6 % (ref 5–13)
NEUTS SEG # BLD: 3.9 K/UL (ref 1.8–8)
NEUTS SEG NFR BLD: 65 % (ref 32–75)
NRBC # BLD: 0 K/UL (ref 0–0.01)
NRBC BLD-RTO: 0 PER 100 WBC
PLATELET # BLD AUTO: 336 K/UL (ref 150–400)
PMV BLD AUTO: 9.4 FL (ref 8.9–12.9)
POTASSIUM SERPL-SCNC: 4.2 MMOL/L (ref 3.5–5.1)
PROT SERPL-MCNC: 7.3 G/DL (ref 6.4–8.2)
RBC # BLD AUTO: 4.16 M/UL (ref 3.8–5.2)
SODIUM SERPL-SCNC: 139 MMOL/L (ref 136–145)
WBC # BLD AUTO: 5.9 K/UL (ref 3.6–11)

## 2020-01-30 RX ORDER — ESOMEPRAZOLE MAGNESIUM 20 MG/1
40 FOR SUSPENSION ORAL DAILY
Qty: 30 PACKET | Refills: 1 | Status: SHIPPED | OUTPATIENT
Start: 2020-01-30 | End: 2020-03-30

## 2020-01-30 NOTE — PROGRESS NOTES
Gómez Bennett is a 36 y.o. female , id x 2(name and ). Reviewed record, history, and  medications. Chief Complaint   Patient presents with    Complete Physical     last CPE- 18    Flank Pain     (L) dull pain under her ribs that radiates to the center of her back. Pressent for a few months. No known injury    Headache     states that they started with the change to wellbutrin, states that she stopped taking them. HA's have decreased. Vitals:    20 1041   BP: 101/69   Pulse: 72   Resp: 16   Temp: 98.3 °F (36.8 °C)   SpO2: 98%   Weight: 181 lb 6.4 oz (82.3 kg)   Height: 5' 6\" (1.676 m)   PainSc:   0 - No pain     Coordination of Care Questionnaire:   1) Have you been to an emergency room, urgent care, or hospitalized since your last visit?   no       2. Have seen or consulted any other health care provider since your last visit? NO      3 most recent PHQ Screens 2018   PHQ Not Done Active Diagnosis of Depression or Bipolar Disorder       Patient is accompanied by self I have received verbal consent from Gómez Bennett to discuss any/all medical information while they are present in the room.

## 2020-01-30 NOTE — PROGRESS NOTES
Chief Complaint   Patient presents with    Complete Physical     last CPE- 1/11/18    Flank Pain     (L) dull pain under her ribs that radiates to the center of her back. Pressent for a few months. No known injury    Headache     states that they started with the change to wellbutrin, states that she stopped taking them. HA's have decreased. she is a 36y.o. year old female who presents for evalution. She was taking wellbutrin but stopped because she thinks the med was making her have worse headaches. She still gets the headache but not as often  She does take an OCP  She also c/o pain in the left flank that radiates into her back  She has alcohol every couple of weeks a few beer and a glass of wine  The pain started in her back and then started feeling in her left side  Not associated with meals now. At firs it seemed to be connected with meals  She tried tylenol and ibuprofen and that seemed to help a little  But came back    No burning with urination  No fever  No cough  No blood in stool and no black stools      Reviewed PmHx, RxHx, FmHx, SocHx, AllgHx and updated and dated in the chart.     Aspirin yes ____   No____ N/A____    Patient Active Problem List    Diagnosis    Syncope    Recurrent depression ([facility] [address]       Nurse notes were reviewed and copied and are correct  Review of Systems - negative except as listed above in the HPI    Objective:     Vitals:    01/30/20 1041   BP: 101/69   Pulse: 72   Resp: 16   Temp: 98.3 °F (36.8 °C)   SpO2: 98%   Weight: 181 lb 6.4 oz (82.3 kg)   Height: 5' 6\" (1.676 m)       Physical Examination: General appearance - alert, well appearing, and in no distress  Mental status - alert, oriented to person, place, and time  Neck - supple, no significant adenopathy  Chest - clear to auscultation, no wheezes, rales or rhonchi, symmetric air entry  Heart - normal rate, regular rhythm, normal S1, S2, no murmurs, rubs, clicks or gallops  Abdomen - tenderness noted midepigastic area  Musculoskeletal - no joint tenderness, deformity or swelling  Extremities - peripheral pulses normal, no pedal edema, no clubbing or cyanosis  Skin - normal coloration and turgor, no rashes, no suspicious skin lesions noted      Assessment/ Plan:   Diagnoses and all orders for this visit:    1. Acute superficial gastritis without hemorrhage  -     Esomeprazole Magnesium; Take 2 Packets by mouth daily for 60 days. -     CBC WITH AUTOMATED DIFF; Future    2. Upper abdominal pain  -     CBC WITH AUTOMATED DIFF; Future  -     METABOLIC PANEL, COMPREHENSIVE; Future     try ppi and gaviscon  If not better in a few days I will refer to GI for scope  Check blood to r/o anemia and also checking liver function  Follow-up and Dispositions    · Return in about 4 days (around 2/3/2020). ICD-10-CM ICD-9-CM    1. Acute superficial gastritis without hemorrhage K29.00 535.40 Esomeprazole Magnesium      CBC WITH AUTOMATED DIFF   2. Upper abdominal pain R10.10 789.09 CBC WITH AUTOMATED DIFF      METABOLIC PANEL, COMPREHENSIVE       I have discussed the diagnosis with the patient and the intended plan as seen in the above orders. The patient has received an after-visit summary and questions were answered concerning future plans.      Medication Side Effects and Warnings were discussed with patient: yes  Patient Labs were reviewed and or requested: yes  Patient Past Records were reviewed and or requested: yes        Patient Instructions   Gaviscon extra strength - take as directed on bottle      The patient verbalizes understanding and agrees with the plan of care        Patient has the advanced directives booklet to review

## 2020-01-30 NOTE — LETTER
2/14/2020 Ms. Lucía Pickens [address] [id number] Dear Zuleika Carrasquillo: 
 
Please find your most recent results below. Resulted Orders METABOLIC PANEL, COMPREHENSIVE (Collected: 1/30/2020 11:40 AM) Result Value Ref Range Sodium 139 136 - 145 mmol/L Potassium 4.2 3.5 - 5.1 mmol/L Chloride 108 97 - 108 mmol/L  
 CO2 26 21 - 32 mmol/L Anion gap 5 5 - 15 mmol/L Glucose 88 65 - 100 mg/dL BUN 11 6 - 20 MG/DL Creatinine 0.68 0.55 - 1.02 MG/DL  
 BUN/Creatinine ratio 16 12 - 20 GFR est AA >60 >60 ml/min/1.73m2 GFR est non-AA >60 >60 ml/min/1.73m2 Comment:  
   Estimated GFR is calculated using the IDMS-traceable Modification of Diet in Renal Disease (MDRD) Study equation, reported for both  Americans (GFRAA) and non- Americans (GFRNA), and normalized to 1.73m2 body surface area. The physician must decide which value applies to the patient. The MDRD study equation should only be used in individuals age 25 or older. It has not been validated for the following: pregnant women, patients with serious comorbid conditions, or on certain medications, or persons with extremes of body size, muscle mass, or nutritional status. Calcium 9.0 8.5 - 10.1 MG/DL Bilirubin, total 0.2 0.2 - 1.0 MG/DL  
 ALT (SGPT) 23 12 - 78 U/L  
 AST (SGOT) 12 (L) 15 - 37 U/L Alk. phosphatase 39 (L) 45 - 117 U/L Protein, total 7.3 6.4 - 8.2 g/dL Albumin 3.8 3.5 - 5.0 g/dL Globulin 3.5 2.0 - 4.0 g/dL A-G Ratio 1.1 1.1 - 2.2 RECOMMENDATIONS: 
 
The liver, kidney and hemoglobin are all normal. 
 
 
 
Please call me if you have any questions: [phone] Sincerely,

## 2020-02-03 ENCOUNTER — OFFICE VISIT (OUTPATIENT)
Dept: FAMILY MEDICINE CLINIC | Age: 35
End: 2020-02-03

## 2020-02-03 ENCOUNTER — HOSPITAL ENCOUNTER (OUTPATIENT)
Dept: LAB | Age: 35
Discharge: HOME | End: 2020-02-03

## 2020-02-03 VITALS
WEIGHT: 184 LBS | RESPIRATION RATE: 16 BRPM | HEIGHT: 66 IN | DIASTOLIC BLOOD PRESSURE: 70 MMHG | OXYGEN SATURATION: 95 % | TEMPERATURE: 98.8 F | SYSTOLIC BLOOD PRESSURE: 101 MMHG | HEART RATE: 78 BPM | BODY MASS INDEX: 29.57 KG/M2

## 2020-02-03 DIAGNOSIS — R10.10 PAIN OF UPPER ABDOMEN: Primary | ICD-10-CM

## 2020-02-03 DIAGNOSIS — R10.10 PAIN OF UPPER ABDOMEN: ICD-10-CM

## 2020-02-03 LAB — LIPASE SERPL-CCNC: 141 U/L (ref 73–393)

## 2020-02-03 NOTE — PROGRESS NOTES
1. Have you been to the ER, urgent care clinic since your last visit? Hospitalized since your last visit? No    2. Have you seen or consulted any other health care providers outside of the [address] [place] [address] since your last visit? Include any pap smears or colon screening.  No     Chief Complaint   Patient presents with    Follow-up    Abdominal Pain       Visit Vitals  /70 (BP 1 Location: Left arm, BP Patient Position: Sitting)   Pulse 78   Temp 98.8 °F (37.1 °C) (Oral)   Resp 16   Ht 5' 6\" (1.676 m)   Wt 184 lb (83.5 kg)   SpO2 95%   BMI 29.70 kg/m²

## 2020-02-03 NOTE — PROGRESS NOTES
Chief Complaint   Patient presents with    Follow-up    Abdominal Pain     Patient reports no change since last visit. Patient reports pain comes and goes throughout the day.  Nausea     she is a 36y.o. year old female who presents for evalution. Still having the abdominal pain off and on  Sometimes is 3/10 and sometimes 7-8/10  Happens daily  She does not associate this with food  She is taking otc nexium 20 mg two tabs  This has not helped  She has not noticed any im prvement with gaviscon  Not taking aspirin or nsaids      Reviewed PmHx, RxHx, FmHx, SocHx, AllgHx and updated and dated in the chart. Aspirin yes ____   No____ N/A____    Patient Active Problem List    Diagnosis    Syncope    Recurrent depression ([facility] [address]       Nurse notes were reviewed and copied and are correct  Review of Systems - negative except as listed above in the HPI    Objective:     Vitals:    02/03/20 0842   BP: 101/70   Pulse: 78   Resp: 16   Temp: 98.8 °F (37.1 °C)   TempSrc: Oral   SpO2: 95%   Weight: 184 lb (83.5 kg)   Height: 5' 6\" (1.676 m)     Physical Examination: General appearance - alert, well appearing, and in no distress  Mental status - alert, oriented to person, place, and time  Abdomen - tenderness noted on left upper abd- over spleen  Or kidney area      Assessment/ Plan:   Diagnoses and all orders for this visit:    1. Pain of upper abdomen  -     LIPASE; Future  -     US ABD LTD; Future  -     REFERRAL TO GASTROENTEROLOGY           ICD-10-CM ICD-9-CM    1. Pain of upper abdomen R10.10 789.09 LIPASE      US ABD LTD      REFERRAL TO GASTROENTEROLOGY       I have discussed the diagnosis with the patient and the intended plan as seen in the above orders. The patient has received an after-visit summary and questions were answered concerning future plans.      Medication Side Effects and Warnings were discussed with patient: yes  Patient Labs were reviewed and or requested: yes  Patient Past Records were reviewed and or requested: yes        There are no Patient Instructions on file for this visit.     The patient verbalizes understanding and agrees with the plan of care        Patient has the advanced directives booklet to review

## 2020-02-10 NOTE — PROGRESS NOTES
The pancreas test is normal. I will wait to see what the gastroenterologist says about the pain.  He will probably want to do an upper endoscopy to look at your stomach on the inside

## 2020-12-30 ENCOUNTER — OFFICE VISIT (OUTPATIENT)
Dept: FAMILY MEDICINE CLINIC | Age: 35
End: 2020-12-30
Payer: COMMERCIAL

## 2020-12-30 VITALS
TEMPERATURE: 98 F | HEIGHT: 66 IN | WEIGHT: 210 LBS | OXYGEN SATURATION: 96 % | SYSTOLIC BLOOD PRESSURE: 108 MMHG | DIASTOLIC BLOOD PRESSURE: 73 MMHG | BODY MASS INDEX: 33.75 KG/M2 | HEART RATE: 85 BPM | RESPIRATION RATE: 20 BRPM

## 2020-12-30 DIAGNOSIS — Z11.1 SCREENING-PULMONARY TB: Primary | ICD-10-CM

## 2020-12-30 PROCEDURE — 99213 OFFICE O/P EST LOW 20 MIN: CPT | Performed by: NURSE PRACTITIONER

## 2020-12-30 NOTE — PROGRESS NOTES
Patient here for ppd but since office is closed Friday for New Year, patient needs labs.    1. Have you been to the ER, urgent care clinic since your last visit?  Hospitalized since your last visit?No    2. Have you seen or consulted any other health care providers outside of the [facility] since your last visit?  Include any pap smears or colon screening. No

## 2021-01-03 NOTE — PROGRESS NOTES
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  Presented requesting PPD but office is closing during reading interval if PPD placed today. No s/s of active TB including fever, chills, night sweats, cough, hemoptysis, or weight loss. No known exposure to persons with active TB. No foreign travel. Patient Active Problem List   Diagnosis Code    [facility] F33.9    Syncope R55     Past Medical History:   Diagnosis Date    Anxiety     Depression     Ear ache      Past Surgical History:   Procedure Laterality Date    HX LEEP PROCEDURE  2016    HX LEEP PROCEDURE      HX OPEN REDUCTION INTERNAL FIXATION      R. elbow    HX TONSILLECTOMY      HX TUMOR REMOVAL      benign     Social History     Tobacco Use    Smoking status: Former Smoker     Packs/day: 0.25     Types: Cigarettes     Quit date: 2015     Years since quittin.9    Smokeless tobacco: Never Used    Tobacco comment: 2-3 cigarettes per day   Substance Use Topics    Alcohol use: Yes     Alcohol/week: 2.0 standard drinks     Types: 1 Glasses of wine, 1 Cans of beer per week     Comment: 3-4 glasses per month    Drug use: No     Family History   Problem Relation Age of Onset    Hypertension Maternal Grandmother     Hypertension Maternal Grandfather      Review of Systems   Constitutional: Negative for chills, diaphoresis, fever, malaise/fatigue and weight loss. HENT: Negative. Eyes: Negative. Respiratory: Negative for cough, hemoptysis, sputum production, shortness of breath and wheezing. Cardiovascular: Negative. Gastrointestinal: Negative. Genitourinary: Negative. Musculoskeletal: Negative for joint pain and myalgias. Skin: Negative. Neurological: Negative. Endo/Heme/Allergies: Negative. Psychiatric/Behavioral: Negative.       O:    Visit Vitals  /73   Pulse 85 Temp 98 °F (36.7 °C)   Resp 20   Ht 5' 6\" (1.676 m)   Wt 210 lb (95.3 kg)   SpO2 96%   BMI 33.89 kg/m²     Physical Examination: General appearance - alert, well appearing, and in no distress, oriented to person, place, and time and well hydrated  Mental status - normal mood, behavior, speech, dress, motor activity, and thought processes  Eyes - sclera anicteric  Neck - supple, no significant adenopathy, thyroid exam: thyroid is normal in size without nodules or tenderness  Chest - clear to auscultation, no wheezes, rales or rhonchi, symmetric air entry  Heart - normal rate, regular rhythm, normal S1, S2, no murmurs, rubs, clicks or gallops, normal bilateral carotid upstroke without bruits, no JVD  Abdomen - soft, nontender, nondistended, no masses or organomegaly  bowel sounds normal  Neurological - alert, oriented, normal speech, no focal findings or movement disorder noted  Extremities - no pedal edema noted, intact peripheral pulses  Skin - normal coloration and turgor, no rashes, no suspicious skin lesions noted    A/P:    Diagnoses and all orders for this visit:    1. Screening-pulmonary TB  No s/s of active disease  Will send quantiferon  -     QUANTIFERON-TB GOLD PLUS      Follow-up and Dispositions    · Return if symptoms worsen or fail to improve. I have discussed the diagnosis with the patient and the intended plan as seen in the above orders. The patient has received an after-visit summary and questions were answered concerning future plans. Patient conveyed understanding of the plan at the time of the visit.     Sundar Evans NP

## 2021-01-04 LAB
M TB IFN-G BLD-IMP: NEGATIVE
QUANTIFERON CRITERIA, QFI1T: NORMAL
QUANTIFERON MITOGEN VALUE: >10 IU/ML
QUANTIFERON NIL VALUE: 0.04 IU/ML
QUANTIFERON TB1 AG: 0.04 IU/ML
QUANTIFERON TB2 AG: 0.04 IU/ML

## 2021-04-28 ENCOUNTER — OFFICE VISIT (OUTPATIENT)
Dept: FAMILY MEDICINE CLINIC | Age: 36
End: 2021-04-28
Payer: COMMERCIAL

## 2021-04-28 VITALS
WEIGHT: 208.2 LBS | HEART RATE: 85 BPM | BODY MASS INDEX: 33.46 KG/M2 | HEIGHT: 66 IN | RESPIRATION RATE: 14 BRPM | TEMPERATURE: 98.9 F | DIASTOLIC BLOOD PRESSURE: 72 MMHG | OXYGEN SATURATION: 95 % | SYSTOLIC BLOOD PRESSURE: 106 MMHG

## 2021-04-28 DIAGNOSIS — Z11.59 NEED FOR HEPATITIS C SCREENING TEST: ICD-10-CM

## 2021-04-28 DIAGNOSIS — G43.009 MIGRAINE WITHOUT AURA AND WITHOUT STATUS MIGRAINOSUS, NOT INTRACTABLE: ICD-10-CM

## 2021-04-28 DIAGNOSIS — Z13.220 SCREENING FOR LIPID DISORDERS: ICD-10-CM

## 2021-04-28 DIAGNOSIS — Z11.1 SCREENING-PULMONARY TB: ICD-10-CM

## 2021-04-28 DIAGNOSIS — R53.83 FATIGUE, UNSPECIFIED TYPE: ICD-10-CM

## 2021-04-28 DIAGNOSIS — R63.1 POLYDIPSIA: ICD-10-CM

## 2021-04-28 DIAGNOSIS — E55.9 VITAMIN D DEFICIENCY: ICD-10-CM

## 2021-04-28 DIAGNOSIS — Z00.00 WELL ADULT EXAM: Primary | ICD-10-CM

## 2021-04-28 DIAGNOSIS — E66.9 OBESITY (BMI 30.0-34.9): ICD-10-CM

## 2021-04-28 PROCEDURE — 99395 PREV VISIT EST AGE 18-39: CPT | Performed by: NURSE PRACTITIONER

## 2021-04-28 RX ORDER — TOPIRAMATE 25 MG/1
TABLET ORAL
Qty: 70 TAB | Refills: 0 | Status: SHIPPED | OUTPATIENT
Start: 2021-04-28 | End: 2021-06-08 | Stop reason: SDUPTHER

## 2021-04-28 NOTE — PROGRESS NOTES
Lazara Levy is a 30 y.o. female    Chief Complaint   Patient presents with    Annual Exam     1. Have you been to the ER, urgent care clinic since your last visit? Hospitalized since your last visit? No    2. Have you seen or consulted any other health care providers outside of the [address] [place] [address] since your last visit? Include any pap smears or colon screening.  No

## 2021-05-04 NOTE — PROGRESS NOTES
Subjective:   30 y.o. female for Well Woman Check. Her gyne and breast care is done elsewhere by her Ob-Gyne physician. Is not longer taking oral contraceptives. 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 Requests TB screening for her upcoming clinical.    Follows a healthy diet for the most part. Exercises regularly. Patient Active Problem List   Diagnosis Code    Recurrent depression ([facility] [address] F33.9    Syncope R55     Allergies   Allergen Reactions    Demerol [Meperidine] Hives     Past Medical History:   Diagnosis Date    Anxiety     Depression     Ear ache      Past Surgical History:   Procedure Laterality Date    HX LEEP PROCEDURE      HX LEEP PROCEDURE      HX OPEN REDUCTION INTERNAL FIXATION      R. elbow    HX TONSILLECTOMY      HX TUMOR REMOVAL      benign     Family History   Problem Relation Age of Onset    Hypertension Maternal Grandmother     Hypertension Maternal Grandfather      Social History     Tobacco Use    Smoking status: Former Smoker     Packs/day: 0.25     Types: Cigarettes     Quit date: 2015     Years since quittin.3    Smokeless tobacco: Never Used    Tobacco comment: 2-3 cigarettes per day   Substance Use Topics    Alcohol use:  Yes     Alcohol/week: 2.0 standard drinks     Types: 1 Glasses of wine, 1 Cans of beer per week     Comment: 3-4 glasses per month        Lab Results   Component Value Date/Time    WBC 5.9 2020 11:40 AM    HGB 12.9 2020 11:40 AM    HCT 41.2 2020 11:40 AM    PLATELET 078 98/10/5311 11:40 AM    MCV 99.0 2020 11:40 AM     Lab Results   Component Value Date/Time    Hemoglobin A1c 5.2 2018 10:04 AM    Glucose 88 2020 11:40 AM    LDL, calculated 114 (H) 2018 10:04 AM    Creatinine 0.68 2020 11:40 AM      Lab Results   Component Value Date/Time    Cholesterol, total 176 2018 10:04 AM    HDL Cholesterol 42 01/11/2018 10:04 AM    LDL, calculated 114 (H) 01/11/2018 10:04 AM    Triglyceride 102 01/11/2018 10:04 AM     Lab Results   Component Value Date/Time    ALT (SGPT) 23 01/30/2020 11:40 AM    Alk. phosphatase 39 (L) 01/30/2020 11:40 AM    Bilirubin, total 0.2 01/30/2020 11:40 AM    Albumin 3.8 01/30/2020 11:40 AM    Protein, total 7.3 01/30/2020 11:40 AM    PLATELET 941 64/44/6782 11:40 AM     Lab Results   Component Value Date/Time    GFR est non-AA >60 01/30/2020 11:40 AM    GFR est AA >60 01/30/2020 11:40 AM    Creatinine 0.68 01/30/2020 11:40 AM    BUN 11 01/30/2020 11:40 AM    Sodium 139 01/30/2020 11:40 AM    Potassium 4.2 01/30/2020 11:40 AM    Chloride 108 01/30/2020 11:40 AM    CO2 26 01/30/2020 11:40 AM     Lab Results   Component Value Date/Time    TSH 1.280 01/11/2018 10:04 AM         ROS: Feeling generally well. No TIA's or unusual headaches, no dysphagia. No prolonged cough. No dyspnea or chest pain on exertion. No abdominal pain, change in bowel habits, black or bloody stools. No urinary tract symptoms. No new or unusual musculoskeletal symptoms. + fatigue. Specific concerns today: c/o increased migraine headaches, notes pain usually behind right eye/right frontal, with associated nausea and sensitivity to light. Headaches occurring 1-2 times per week. Has difficulty concentrating and completing her work when she has a headache and the day after the headache as well. Has noticed she has been thirsty, drinking more than her usual for a few weeks. .    Objective: The patient appears well, alert, oriented x 3, in no distress.   Visit Vitals  /72 (BP 1 Location: Right arm, BP Patient Position: Sitting)   Pulse 85   Temp 98.9 °F (37.2 °C) (Oral)   Resp 14   Ht 5' 6\" (1.676 m)   Wt 208 lb 3.2 oz (94.4 kg)   SpO2 95%   BMI 33.60 kg/m²     Physical Examination: General appearance - alert, well appearing, and in no distress, oriented to person, place, and time, improved and well hydrated  Mental status - normal mood, behavior, speech, dress, motor activity, and thought processes  Eyes - sclera anicteric  Ears - bilateral TM's and external ear canals normal  Nose - normal and patent, no erythema, discharge or polyps  Mouth - mucous membranes moist, pharynx normal without lesions, dental hygiene good and tongue normal  Neck - supple, no significant adenopathy, thyroid exam: thyroid is normal in size without nodules or tenderness  Chest - clear to auscultation, no wheezes, rales or rhonchi, symmetric air entry  Heart - normal rate, regular rhythm, normal S1, S2, no murmurs, rubs, clicks or gallops, normal bilateral carotid upstroke without bruits, no JVD  Abdomen - soft, nontender, nondistended, no masses or organomegaly  bowel sounds normal  Neurological - alert, oriented, normal speech, no focal findings or movement disorder noted  Musculoskeletal - no joint tenderness, deformity or swelling, no muscular tenderness noted  Extremities - no pedal edema noted, intact peripheral pulses  Skin - normal coloration and turgor, no rashes, no suspicious skin lesions noted      Assessment/Plan:     Diagnoses and all orders for this visit:    1. Well adult exam  Well Woman  lose weight, increase physical activity, limit alcohol consumption, follow low fat diet, follow low salt diet    2. Obesity (BMI 30.0-34.9)  I have reviewed/discussed the above normal BMI with the patient. I have recommended the following interventions: dietary management education, guidance, and counseling, encourage exercise and monitor weight . .      3. Need for hepatitis C screening test  -     HEPATITIS C AB; Future    4. Screening-pulmonary TB  -     QUANTIFERON-TB GOLD PLUS; Future    5. Screening for lipid disorders  -     LIPID PANEL; Future    6.  Migraine without aura and without status migrainosus, not intractable  Worsening  Add rx  -     topiramate (TOPAMAX) 25 mg tablet; Week 1- 1 tab qpm, week 2- 1 tab BID, week 3- 1 tab qam, 2 tab qpm, week 4 and beyond- 2 tab BID    7. Fatigue, unspecified type  -     CBC W/O DIFF; Future  -     METABOLIC PANEL, COMPREHENSIVE; Future  -     TSH 3RD GENERATION; Future  -     VITAMIN D, 25 HYDROXY; Future    8. Polydipsia  -     HEMOGLOBIN A1C WITH EAG; Future      Follow-up and Dispositions    · Return in about 6 weeks (around 6/9/2021), or if symptoms worsen or fail to improve, for headache. I have discussed the diagnosis with the patient and the intended plan as seen in the above orders. The patient has received an after-visit summary and questions were answered concerning future plans. Patient conveyed understanding of the plan at the time of the visit.     Mitul Horton NP

## 2021-05-05 NOTE — PATIENT INSTRUCTIONS
Fatigue: Care Instructions Your Care Instructions Fatigue is a feeling of tiredness, exhaustion, or lack of energy. You may feel fatigue because of too much or not enough activity. It can also come from stress, lack of sleep, boredom, and poor diet. Many medical problems, such as viral infections, can cause fatigue. Emotional problems, especially depression, are often the cause of fatigue. Fatigue is most often a symptom of another problem. Treatment for fatigue depends on the cause. For example, if you have fatigue because you have a certain health problem, treating this problem also treats your fatigue. If depression or anxiety is the cause, treatment may help. Follow-up care is a key part of your treatment and safety. Be sure to make and go to all appointments, and call your doctor if you are having problems. It's also a good idea to know your test results and keep a list of the medicines you take. How can you care for yourself at home? · Get regular exercise. But don't overdo it. Go back and forth between rest and exercise. · Get plenty of rest. 
· Eat a healthy diet. Do not skip meals, especially breakfast. 
· Reduce your use of caffeine, tobacco, and alcohol. Caffeine is most often found in coffee, tea, cola drinks, and chocolate. · Limit medicines that can cause fatigue. This includes tranquilizers and cold and allergy medicines. When should you call for help? Watch closely for changes in your health, and be sure to contact your doctor if: 
  · You have new symptoms such as fever or a rash.  
  · Your fatigue gets worse.  
  · You have been feeling down, depressed, or hopeless. Or you may have lost interest in things that you usually enjoy.  
  · You are not getting better as expected. Where can you learn more? Go to http://www.gray.com/ Enter E856 in the search box to learn more about \"Fatigue: Care Instructions. \" Current as of: February 26, 2020               Content Version: 12.8 © 2006-2021 [employer]. Care instructions adapted under license by [employer] (which disclaims liability or warranty for this information). If you have questions about a medical condition or this instruction, always ask your healthcare professional. [address] any warranty or liability for your use of this information. Starting a Weight Loss Plan: Care Instructions Overview If you're thinking about losing weight, it can be hard to know where to start. Your doctor can help you set up a weight loss plan that best meets your needs. You may want to take a class on nutrition or exercise, or you could join a weight loss support group. If you have questions about how to make changes to your eating or exercise habits, ask your doctor about seeing a registered dietitian or an exercise specialist. 
It can be a big challenge to lose weight. But you don't have to make huge changes at once. Make small changes, and stick with them. When those changes become habit, add a few more changes. If you don't think you're ready to make changes right now, try to pick a date in the future. Make an appointment to see your doctor to discuss whether the time is right for you to start a plan. Follow-up care is a key part of your treatment and safety. Be sure to make and go to all appointments, and call your doctor if you are having problems. It's also a good idea to know your test results and keep a list of the medicines you take. How can you care for yourself at home? · Set realistic goals. Many people expect to lose much more weight than is likely. A weight loss of 5% to 10% of your body weight may be enough to improve your health. · Get family and friends involved to provide support. Talk to them about why you are trying to lose weight, and ask them to help.  They can help by participating in exercise and having meals with you, even if they may be eating something different. · Find what works best for you. If you do not have time or do not like to cook, a program that offers meal replacement bars or shakes may be better for you. Or if you like to prepare meals, finding a plan that includes daily menus and recipes may be best. 
· Ask your doctor about other health professionals who can help you achieve your weight loss goals. ? A dietitian can help you make healthy changes in your diet. ? An exercise specialist or  can help you develop a safe and effective exercise program. 
? A counselor or psychiatrist can help you cope with issues such as depression, anxiety, or family problems that can make it hard to focus on weight loss. · Consider joining a support group for people who are trying to lose weight. Your doctor can suggest groups in your area. Where can you learn more? Go to http://www.gray.com/ Enter Z401 in the search box to learn more about \"Starting a Weight Loss Plan: Care Instructions. \" Current as of: September 23, 2020               Content Version: 12.8 © 3126-6681 [employer]. Care instructions adapted under license by [employer] (which disclaims liability or warranty for this information). If you have questions about a medical condition or this instruction, always ask your healthcare professional. [address] any warranty or liability for your use of this information. Well Visit, Ages 25 to 48: Care Instructions Overview Well visits can help you stay healthy. Your doctor has checked your overall health and may have suggested ways to take good care of yourself. Your doctor also may have recommended tests. At home, you can help prevent illness with healthy eating, regular exercise, and other steps. Follow-up care is a key part of your treatment and safety.  Be sure to make and go to all appointments, and call your doctor if you are having problems. It's also a good idea to know your test results and keep a list of the medicines you take. How can you care for yourself at home? · Get screening tests that you and your doctor decide on. Screening helps find diseases before any symptoms appear. · Eat healthy foods. Choose fruits, vegetables, whole grains, protein, and low-fat dairy foods. Limit fat, especially saturated fat. Reduce salt in your diet. · Limit alcohol. If you are a man, have no more than 2 drinks a day or 14 drinks a week. If you are a woman, have no more than 1 drink a day or 7 drinks a week. · Get at least 30 minutes of physical activity on most days of the week. Walking is a good choice. You also may want to do other activities, such as running, swimming, cycling, or playing tennis or team sports. Discuss any changes in your exercise program with your doctor. · Reach and stay at a healthy weight. This will lower your risk for many problems, such as obesity, diabetes, heart disease, and high blood pressure. · Do not smoke or allow others to smoke around you. If you need help quitting, talk to your doctor about stop-smoking programs and medicines. These can increase your chances of quitting for good. · Care for your mental health. It is easy to get weighed down by worry and stress. Learn strategies to manage stress, like deep breathing and mindfulness, and stay connected with your family and community. If you find you often feel sad or hopeless, talk with your doctor. Treatment can help. · Talk to your doctor about whether you have any risk factors for sexually transmitted infections (STIs). You can help prevent STIs if you wait to have sex with a new partner (or partners) until you've each been tested for STIs. It also helps if you use condoms (male or female condoms) and if you limit your sex partners to one person who only has sex with you. Vaccines are available for some STIs, such as HPV.  
· Use birth control if it's important to you to prevent pregnancy. Talk with your doctor about the choices available and what might be best for you. · If you think you may have a problem with alcohol or drug use, talk to your doctor. This includes prescription medicines (such as amphetamines and opioids) and illegal drugs (such as cocaine and methamphetamine). Your doctor can help you figure out what type of treatment is best for you. · Protect your skin from too much sun. When you're outdoors from 10 a.m. to 4 p.m., stay in the shade or cover up with clothing and a hat with a wide brim. Wear sunglasses that block UV rays. Even when it's cloudy, put broad-spectrum sunscreen (SPF 30 or higher) on any exposed skin. · See a dentist one or two times a year for checkups and to have your teeth cleaned. · Wear a seat belt in the car. When should you call for help? Watch closely for changes in your health, and be sure to contact your doctor if you have any problems or symptoms that concern you. Where can you learn more? Go to http://www.christensen.com/ Enter P072 in the search box to learn more about \"Well Visit, Ages 25 to 48: Care Instructions. \" Current as of: May 27, 2020               Content Version: 12.8 © 2006-2021 [employer]. Care instructions adapted under license by [employer] (which disclaims liability or warranty for this information). If you have questions about a medical condition or this instruction, always ask your healthcare professional. [place] [address] any warranty or liability for your use of this information.

## 2021-05-08 LAB
25(OH)D3 SERPL-MCNC: 19.4 NG/ML (ref 30–100)
ALBUMIN SERPL-MCNC: 4 G/DL (ref 3.5–5)
ALBUMIN/GLOB SERPL: 1.2 {RATIO} (ref 1.1–2.2)
ALP SERPL-CCNC: 53 U/L (ref 45–117)
ALT SERPL-CCNC: 25 U/L (ref 12–78)
ANION GAP SERPL CALC-SCNC: 6 MMOL/L (ref 5–15)
AST SERPL-CCNC: 15 U/L (ref 15–37)
BILIRUB SERPL-MCNC: 0.4 MG/DL (ref 0.2–1)
BUN SERPL-MCNC: 14 MG/DL (ref 6–20)
BUN/CREAT SERPL: 21 (ref 12–20)
CALCIUM SERPL-MCNC: 9.2 MG/DL (ref 8.5–10.1)
CHLORIDE SERPL-SCNC: 113 MMOL/L (ref 97–108)
CHOLEST SERPL-MCNC: 202 MG/DL
CO2 SERPL-SCNC: 22 MMOL/L (ref 21–32)
CREAT SERPL-MCNC: 0.68 MG/DL (ref 0.55–1.02)
ERYTHROCYTE [DISTWIDTH] IN BLOOD BY AUTOMATED COUNT: 13.1 % (ref 11.5–14.5)
EST. AVERAGE GLUCOSE BLD GHB EST-MCNC: 97 MG/DL
GLOBULIN SER CALC-MCNC: 3.4 G/DL (ref 2–4)
GLUCOSE SERPL-MCNC: 85 MG/DL (ref 65–100)
HBA1C MFR BLD: 5 % (ref 4–5.6)
HCT VFR BLD AUTO: 41.9 % (ref 35–47)
HCV AB SERPL QL IA: NONREACTIVE
HCV COMMENT,HCGAC: NORMAL
HDLC SERPL-MCNC: 54 MG/DL
HDLC SERPL: 3.7 {RATIO} (ref 0–5)
HGB BLD-MCNC: 13.8 G/DL (ref 11.5–16)
LDLC SERPL CALC-MCNC: 134.4 MG/DL (ref 0–100)
LIPID PROFILE,FLP: ABNORMAL
MCH RBC QN AUTO: 31.4 PG (ref 26–34)
MCHC RBC AUTO-ENTMCNC: 32.9 G/DL (ref 30–36.5)
MCV RBC AUTO: 95.4 FL (ref 80–99)
NRBC # BLD: 0 K/UL (ref 0–0.01)
NRBC BLD-RTO: 0 PER 100 WBC
PLATELET # BLD AUTO: 272 K/UL (ref 150–400)
PMV BLD AUTO: 10.1 FL (ref 8.9–12.9)
POTASSIUM SERPL-SCNC: 4.3 MMOL/L (ref 3.5–5.1)
PROT SERPL-MCNC: 7.4 G/DL (ref 6.4–8.2)
RBC # BLD AUTO: 4.39 M/UL (ref 3.8–5.2)
SODIUM SERPL-SCNC: 141 MMOL/L (ref 136–145)
TRIGL SERPL-MCNC: 68 MG/DL (ref ?–150)
TSH SERPL DL<=0.05 MIU/L-ACNC: 0.88 UIU/ML (ref 0.36–3.74)
VLDLC SERPL CALC-MCNC: 13.6 MG/DL
WBC # BLD AUTO: 6.7 K/UL (ref 3.6–11)

## 2021-05-11 LAB
GAMMA INTERFERON BACKGROUND BLD IA-ACNC: 0.01 IU/ML
M TB IFN-G BLD-IMP: NEGATIVE
M TB IFN-G CD4+ BCKGRND COR BLD-ACNC: 0.04 IU/ML
MITOGEN IGNF BLD-ACNC: >10 IU/ML
QUANTIFERON INCUBATION, QF1T: NORMAL
QUANTIFERON TB2 AG: 0.04 IU/ML
SERVICE CMNT-IMP: NORMAL
SPECIMEN STATUS REPORT, ROLRST: NORMAL

## 2021-05-18 RX ORDER — ERGOCALCIFEROL 1.25 MG/1
50000 CAPSULE ORAL
Qty: 12 CAP | Refills: 0 | Status: SHIPPED | OUTPATIENT
Start: 2021-05-18 | End: 2021-08-04

## 2021-05-19 NOTE — PROGRESS NOTES
Vit D levels low- this is a possible cause for your fatigue. rx for weekly high dose replacement sent to pharmacy on record, RTC in 12 weeks for recheck of levels. LDL and total cholesterol are above goal. Recommend heart healthy diet, 150 minutes moderate exercise weekly, and weight loss to improve this. Repeat test in 6 months. Thyroid level is normal.  Electrolytes, liver and kidney function looks good. Blood counts normal.  a1c normal.  Hep c screening negative. quantiferon TB screening is negative.

## 2021-05-19 NOTE — PROGRESS NOTES
Attempt to reach pt unsuccessful. LVM for patient to [facility] for labs.

## 2021-06-08 DIAGNOSIS — G43.009 MIGRAINE WITHOUT AURA AND WITHOUT STATUS MIGRAINOSUS, NOT INTRACTABLE: ICD-10-CM

## 2021-06-09 RX ORDER — TOPIRAMATE 50 MG/1
50 TABLET, FILM COATED ORAL 2 TIMES DAILY
Qty: 180 TABLET | Refills: 0 | Status: SHIPPED | OUTPATIENT
Start: 2021-06-09 | End: 2021-06-18 | Stop reason: SDUPTHER

## 2021-06-18 ENCOUNTER — OFFICE VISIT (OUTPATIENT)
Dept: FAMILY MEDICINE CLINIC | Age: 36
End: 2021-06-18
Payer: COMMERCIAL

## 2021-06-18 VITALS
RESPIRATION RATE: 14 BRPM | TEMPERATURE: 98.9 F | BODY MASS INDEX: 31.98 KG/M2 | HEIGHT: 66 IN | OXYGEN SATURATION: 96 % | WEIGHT: 199 LBS | SYSTOLIC BLOOD PRESSURE: 105 MMHG | DIASTOLIC BLOOD PRESSURE: 72 MMHG | HEART RATE: 85 BPM

## 2021-06-18 DIAGNOSIS — E55.9 VITAMIN D DEFICIENCY: ICD-10-CM

## 2021-06-18 DIAGNOSIS — G43.009 MIGRAINE WITHOUT AURA AND WITHOUT STATUS MIGRAINOSUS, NOT INTRACTABLE: Primary | ICD-10-CM

## 2021-06-18 PROCEDURE — 99213 OFFICE O/P EST LOW 20 MIN: CPT | Performed by: NURSE PRACTITIONER

## 2021-06-18 RX ORDER — TOPIRAMATE 50 MG/1
50 TABLET, FILM COATED ORAL 2 TIMES DAILY
Qty: 180 TABLET | Refills: 1 | Status: SHIPPED | OUTPATIENT
Start: 2021-06-18

## 2021-06-18 NOTE — PROGRESS NOTES
Malu Kinney is a 30 y.o. female      Chief Complaint   Patient presents with    Follow-up     1. Have you been to the ER, urgent care clinic since your last visit? Hospitalized since your last visit? No    2. Have you seen or consulted any other health care providers outside of the [address] [place] [address] since your last visit? Include any pap smears or colon screening.  No

## 2021-06-18 NOTE — PATIENT INSTRUCTIONS
Recurring Migraine Headache: Care Instructions Overview Migraines are painful, throbbing headaches. They often start on one side of the head. They may cause nausea and vomiting and make you sensitive to light, sound, or smell. Some people may have only a few migraines throughout life. Others have them as often as several times a month. The goal of treatment is to reduce the number of migraines you have and relieve your symptoms. Even with treatment, you may continue to have migraines. You play an important role in dealing with your headaches. Work on avoiding things that seem to trigger your migraines. When you feel a headache coming on, act quickly to stop it before it gets worse. Follow-up care is a key part of your treatment and safety. Be sure to make and go to all appointments, and call your doctor if you are having problems. It's also a good idea to know your test results and keep a list of the medicines you take. How can you care for yourself at home? · Do not drive if you have taken a prescription pain medicine. · Rest in a quiet, dark room until your headache is gone. Close your eyes and try to relax or go to sleep. Do not watch TV or read. · Put a cold, moist cloth or cold pack on the painful area for 10 to 20 minutes at a time. Put a thin cloth between the cold pack and your skin. · Have someone gently massage your neck and shoulders. · Take your medicines exactly as prescribed. Call your doctor if you think you are having a problem with your medicine. You will get more details on the specific medicines your doctor prescribes. · Don't take medicine for headache pain too often. Talk to your doctor if you are taking medicine more than 2 days a week to stop a headache. Taking too much pain medicine can lead to more headaches. These are called medicine-overuse headaches. To prevent migraines · Keep a headache diary so you can figure out what triggers your headaches.  Avoiding triggers may help you prevent headaches. Record when each headache began, how long it lasted, and what the pain was like. Write down any other symptoms you had with the headache. These may include nausea, flashing lights or dark spots, or sensitivity to bright light or loud noise. Note if the headache occurred near your period. List anything that might have triggered the headache. Triggers may include certain foods (chocolate, cheese, wine) or odors, smoke, bright light, stress, or lack of sleep. · If your doctor has prescribed medicine for your migraines, take it as directed. You may have medicine that you take only when you get a migraine and medicine that you take all the time to help prevent migraines. ? If your doctor has prescribed medicine for when you get a headache, take it at the first sign of a migraine, unless your doctor has given you other instructions. ? If your doctor has prescribed medicine to prevent migraines, take it exactly as prescribed. Call your doctor if you think you are having a problem with your medicine. · Find healthy ways to deal with stress. Migraines are most common during or right after stressful times. Try finding ways to reduce stress like practicing mindfulness or deep breathing exercises. · Get regular sleep and exercise. But be careful to not push yourself too hard during exercise. It may trigger a headache. · Eat regular meals, and avoid foods and drinks that often trigger migraines. These include chocolate and alcohol, especially red wine and port. Chemicals used in food, such as aspartame and monosodium glutamate (MSG), also can trigger migraines. So can some food additives, such as those found in hot dogs, marino, cold cuts, aged cheeses, and pickled foods. · Limit caffeine by not drinking too much coffee, tea, or soda. Do not quit caffeine suddenly, because that can also give you migraines. · Do not smoke or allow others to smoke around you.  If you need help quitting, talk to your doctor about stop-smoking programs and medicines. These can increase your chances of quitting for good. · If you are taking birth control pills or hormone therapy, talk to your doctor about whether they are triggering your migraines. When should you call for help? Call 911 anytime you think you may need emergency care. For example, call if: 
  · You have symptoms of a stroke. These may include: 
? Sudden numbness, tingling, weakness, or loss of movement in your face, arm, or leg, especially on only one side of your body. ? Sudden vision changes. ? Sudden trouble speaking. ? Sudden confusion or trouble understanding simple statements. ? Sudden problems with walking or balance. ? A sudden, severe headache that is different from past headaches. Call your doctor now or seek immediate medical care if: 
  · You develop a fever and a stiff neck.  
  · You have new nausea and vomiting, or you cannot keep down food or liquids. Watch closely for changes in your health, and be sure to contact your doctor if: 
  · You have a headache that does not get better within 1 or 2 days.  
  · Your headaches get worse or happen more often. Where can you learn more? Go to http://www.gray.com/ Enter V975 in the search box to learn more about \"Recurring Migraine Headache: Care Instructions. \" Current as of: August 4, 2020               Content Version: 12.8 © 2006-2021 Healthwise, Incorporated. Care instructions adapted under license by [employer] (which disclaims liability or warranty for this information). If you have questions about a medical condition or this instruction, always ask your healthcare professional. [place] [address] any warranty or liability for your use of this information.

## 2021-06-19 NOTE — PROGRESS NOTES
HISTORY OF PRESENT ILLNESS  Lucía Lam is a 30 y.o. female. Patient presents with: Follow-up migraines    Doing well, reports no headaches since completing first week on topiramate. Good compliance, no apparent side effects. Wants to continue current therapy. Weight is down 9 lbs in the past 2 months as well with diet changes. Has completed 8 weeks of vit d replacement rx. Notes fatigue has been gradually improving. Review of Systems   Constitutional: Negative for chills, fever, malaise/fatigue and weight loss. HENT: Negative. Eyes: Negative. Respiratory: Negative. Cardiovascular: Negative. Gastrointestinal: Negative. Genitourinary: Negative. Musculoskeletal: Negative. Skin: Negative. Neurological: Negative for headaches. Endo/Heme/Allergies: Negative. Psychiatric/Behavioral: Negative. Visit Vitals  /72 (BP 1 Location: Left upper arm, BP Patient Position: Sitting, BP Cuff Size: Small adult)   Pulse 85   Temp 98.9 °F (37.2 °C) (Oral)   Resp 14   Ht 5' 6\" (1.676 m)   Wt 199 lb (90.3 kg)   SpO2 96%   BMI 32.12 kg/m²       Physical Exam  Constitutional:       General: She is not in acute distress. Appearance: Normal appearance. HENT:      Head: Normocephalic and atraumatic. Eyes:      General: No scleral icterus. Conjunctiva/sclera: Conjunctivae normal.   Neck:      Thyroid: No thyromegaly or thyroid tenderness. Cardiovascular:      Rate and Rhythm: Normal rate and regular rhythm. Pulses: Normal pulses. Heart sounds: Normal heart sounds. No murmur heard. No friction rub. No gallop. Abdominal:      General: Bowel sounds are normal.      Palpations: Abdomen is soft. Tenderness: There is no abdominal tenderness. Musculoskeletal:      Cervical back: Normal range of motion and neck supple. Lymphadenopathy:      Cervical: No cervical adenopathy. Skin:     General: Skin is warm and dry.       Capillary Refill: Capillary refill takes less than 2 seconds. Coloration: Skin is not jaundiced. Findings: No rash. Neurological:      General: No focal deficit present. Mental Status: She is alert and oriented to person, place, and time. Coordination: Coordination normal.      Gait: Gait normal.   Psychiatric:         Mood and Affect: Mood normal.         Behavior: Behavior normal.         Thought Content: Thought content normal.         Judgment: Judgment normal.         ASSESSMENT and PLAN  Diagnoses and all orders for this visit:    1. Migraine without aura and without status migrainosus, not intractable  Improved  Continue rx  -     topiramate (TOPAMAX) 50 mg tablet; Take 1 Tablet by mouth two (2) times a day. 2. Vitamin D deficiency  Fatigue improving   Complete remaining 4 weeks of therapy, then repeat Vit D level  -     VITAMIN D, 25 HYDROXY; Future      Follow-up and Dispositions    · Return in about 4 weeks (around 7/16/2021) for vit d . I have discussed the diagnosis with the patient and the intended plan as seen in the above orders. The patient has received an after-visit summary and questions were answered concerning future plans. Patient conveyed understanding of the plan at the time of the visit.     Shabnam Jaramillo NP

## 2022-03-18 PROBLEM — F33.9 RECURRENT DEPRESSION (HCC): Status: ACTIVE | Noted: 2018-01-11

## 2022-03-19 PROBLEM — R55 SYNCOPE: Status: ACTIVE | Noted: 2018-01-23

## 2023-05-25 RX ORDER — TOPIRAMATE 50 MG/1
50 TABLET, FILM COATED ORAL 2 TIMES DAILY
COMMUNITY
Start: 2021-06-18

## 2024-04-01 ENCOUNTER — TELEPHONE (OUTPATIENT)
Age: 39
End: 2024-04-01